# Patient Record
Sex: MALE | Race: WHITE | NOT HISPANIC OR LATINO | Employment: OTHER | URBAN - METROPOLITAN AREA
[De-identification: names, ages, dates, MRNs, and addresses within clinical notes are randomized per-mention and may not be internally consistent; named-entity substitution may affect disease eponyms.]

---

## 2017-02-03 ENCOUNTER — OFFICE VISIT (OUTPATIENT)
Dept: URGENT CARE | Facility: URGENT CARE | Age: 72
End: 2017-02-03
Payer: COMMERCIAL

## 2017-02-03 VITALS
BODY MASS INDEX: 28.97 KG/M2 | SYSTOLIC BLOOD PRESSURE: 108 MMHG | HEIGHT: 72 IN | WEIGHT: 213.9 LBS | TEMPERATURE: 98.7 F | OXYGEN SATURATION: 94 % | DIASTOLIC BLOOD PRESSURE: 70 MMHG | HEART RATE: 96 BPM

## 2017-02-03 DIAGNOSIS — J18.9 PNEUMONIA OF LEFT LUNG DUE TO INFECTIOUS ORGANISM, UNSPECIFIED PART OF LUNG: Primary | ICD-10-CM

## 2017-02-03 PROCEDURE — 99202 OFFICE O/P NEW SF 15 MIN: CPT | Performed by: FAMILY MEDICINE

## 2017-02-03 RX ORDER — SIMVASTATIN 20 MG
20 TABLET ORAL AT BEDTIME
Status: ON HOLD | COMMUNITY
Start: 2017-01-13 | End: 2024-08-25

## 2017-02-03 RX ORDER — AZITHROMYCIN 250 MG/1
TABLET, FILM COATED ORAL
Qty: 6 TABLET | Refills: 0 | Status: ON HOLD | OUTPATIENT
Start: 2017-02-03 | End: 2024-08-25

## 2017-02-03 RX ORDER — DIPHENOXYLATE HYDROCHLORIDE AND ATROPINE SULFATE 2.5; .025 MG/1; MG/1
TABLET ORAL
Status: ON HOLD | COMMUNITY
Start: 2006-07-17 | End: 2024-08-25

## 2017-02-03 NOTE — PROGRESS NOTES
SUBJECTIVE:   Magan Montanez is a 71 year old male presenting with a chief complaint of cough (productive of phlegm, a little worse when supine.  Mild coughing attacks) for 9 days and fever (up to 102 F) starting 7 days ago.  Onset of symptoms was 8 days ago.  Course of illness is still present.    Current and Associated symptoms: as listed above.   Treatment measures tried include none.    Past medical history:    Hyperlipidemia    Current Outpatient Prescriptions   Medication Sig Dispense Refill     aspirin 81 MG tablet Take 81 mg by mouth       Multiple Vitamin (MULTI-VITAMINS) TABS        simvastatin (ZOCOR) 20 MG tablet Take 20 mg by mouth       Social History   Substance Use Topics     Smoking status: Never Smoker      Smokeless tobacco: Never Used     Alcohol Use: 0.0 oz/week     0 Standard drinks or equivalent per week      Comment: rarely       ROS:  Review of systems negative except as stated above.    OBJECTIVE  :/70 mmHg  Pulse 96  Temp(Src) 98.7  F (37.1  C) (Oral)  Ht 6' (1.829 m)  Wt 213 lb 14.4 oz (97.024 kg)  BMI 29.00 kg/m2  SpO2 94%  GENERAL APPEARANCE: healthy, alert and no distress  HENT: TM's normal bilaterally and oral mucous membranes moist, no erythema noted  NECK: supple, nontender, no lymphadenopathy  RESP: rales L lower posterior  CV: regular rates and rhythm, normal S1 S2, no murmur noted    ASSESSMENT:  Cough  Pneumonia of the left lung    PLAN:  Rx:  Azithromycin  follow up with the primary care provider if not better in 7-10 days  Go to the emergency room if there is worsening shortness of breath.   See orders in Epic    Cholo Forbes MD

## 2017-02-03 NOTE — PATIENT INSTRUCTIONS
follow up with your primary care provider if not better in 7-10 days.     Go to the emergency room if there is worsening shortness of breath.

## 2017-02-03 NOTE — MR AVS SNAPSHOT
"              After Visit Summary   2/3/2017    Magan Montanez    MRN: [id number]           Patient Information     Date Of Birth          1945        Visit Information        Provider Department      2/3/2017 9:15 AM Cholo Forbes MD [facility]        Today's Diagnoses     Pneumonia of left lung due to infectious organism, unspecified part of lung    -  1       Care Instructions    follow up with your primary care provider if not better in 7-10 days.     Go to the emergency room if there is worsening shortness of breath.                Follow-ups after your visit        Who to contact     If you have questions or need follow up information about today's clinic visit or your schedule please contact [facility] directly at [phone].  Normal or non-critical lab and imaging results will be communicated to you by "[employer]hart, letter or phone within 4 business days after the clinic has received the results. If you do not hear from us within 7 days, please contact the clinic through "[employer]hart or phone. If you have a critical or abnormal lab result, we will notify you by phone as soon as possible.  Submit refill requests through [employer] or call your pharmacy and they will forward the refill request to us. Please allow 3 business days for your refill to be completed.          Additional Information About Your Visit        MyChart Information     [employer] lets you send messages to your doctor, view your test results, renew your prescriptions, schedule appointments and more. To sign up, go to [email or web address] . Click on \"Log in\" on the left side of the screen, which will take you to the Welcome page. Then click on \"Sign up Now\" on the right side of the page.     You will be asked to enter the access code listed below, as well as some personal information. Please follow the directions to create your username and password.     Your access code is: [email or web address]  Expires: 5/4/2017  9:36 " AM     Your access code will  in 90 days. If you need help or a new code, please call your [place] clinic or [phone].        Care EveryWhere ID     This is your Care EveryWhere ID. This could be used by other organizations to access your [place] medical records  [id number]        Your Vitals Were     Pulse Temperature Height BMI (Body Mass Index) Pulse Oximetry       96 98.7  F (37.1  C) (Oral) 6' (1.829 m) 29.00 kg/m2 94%        Blood Pressure from Last 3 Encounters:   17 108/70    Weight from Last 3 Encounters:   17 213 lb 14.4 oz (97.024 kg)              Today, you had the following     No orders found for display         Today's Medication Changes          These changes are accurate as of: 2/3/17  9:36 AM.  If you have any questions, ask your nurse or doctor.               Start taking these medicines.        Dose/Directions    azithromycin 250 MG tablet   Commonly known as:  ZITHROMAX   Used for:  Pneumonia of left lung due to infectious organism, unspecified part of lung   Started by:  Cholo Forbes MD        Two tablets first day, then one tablet daily for four days.   Quantity:  6 tablet   Refills:  0            Where to get your medicines      These medications were sent to [facility] - [address]  [address]  [address], [place] [address]     Phone:  [phone]    - azithromycin 250 MG tablet             Primary Care Provider    None Specified       No primary provider on file.        Thank you!     Thank you for choosing [facility]  for your care. Our goal is always to provide you with excellent care. Hearing back from our patients is one way we can continue to improve our services. Please take a few minutes to complete the written survey that you may receive in the mail after your visit with us. Thank you!             Your Updated Medication List - Protect others around you: Learn how to safely use, store  and throw away your medicines at www.disposemymeds.org.          This list is accurate as of: 2/3/17  9:36 AM.  Always use your most recent med list.                   Brand Name Dispense Instructions for use    aspirin 81 MG tablet      Take 81 mg by mouth       azithromycin 250 MG tablet    ZITHROMAX    6 tablet    Two tablets first day, then one tablet daily for four days.       MULTI-VITAMINS Tabs          simvastatin 20 MG tablet    ZOCOR     Take 20 mg by mouth

## 2017-02-03 NOTE — NURSING NOTE
Chief Complaint   Patient presents with     Cough     fever 101-102 X 8 days ago       Initial /70 mmHg  Pulse 96  Temp(Src) 98.7  F (37.1  C) (Oral)  Ht 6' (1.829 m)  Wt 213 lb 14.4 oz (97.024 kg)  BMI 29.00 kg/m2  SpO2 94% Estimated body mass index is 29 kg/(m^2) as calculated from the following:    Height as of this encounter: 6' (1.829 m).    Weight as of this encounter: 213 lb 14.4 oz (97.024 kg).  BP completed using cuff size: regular

## 2017-02-04 ENCOUNTER — TELEPHONE (OUTPATIENT)
Dept: NURSING | Facility: CLINIC | Age: 72
End: 2017-02-04

## 2017-02-05 NOTE — TELEPHONE ENCOUNTER
"Call Type: Triage Call    Presenting Problem: \"My  is coughing so bad, he is sitting up  in a chair.\"  Post pneumonia.  Patient says he doesn't like taking a  lot of drugs.  Triage Note:  Guideline Title: Cough - Adult  Recommended Disposition: See Provider within 24 hours  Original Inclination: Wanted to speak with a nurse  Override Disposition:  Intended Action: Follow advice given  Physician Contacted: No  Coughing spells lasting second to minutes occurring for 7 days or longer ?  YES  Known chronic lung disease AND has developed upper respiratory infection symptoms ?  NO  Severe breathing problems ? NO  Continuous cough causing difficulty breathing ? NO  Coughing up large amount of obvious blood (not blood-streaked sputum) ? NO  Blood streaked sputum ? NO  Gradual onset of cough when lying down AND having to sleep on more than one pillow  or with head of bed elevated ? NO  New onset or worsening cough AND temperature up to 101.5F (38.6C) AND not  responding to 12 hours of home care ? NO  Recurrent episodes of uncontrolled coughing interfering with ability to carry out  usual activities or with normal sleep patterns ? NO  Cough producing pink, frothy sputum ? NO  Any temperature elevation in an immunocompromised individual or a frail elderly  person ? NO  New onset or worsening cough AND asthma with increasing frequency of flare-ups  since last scheduled appointment ? NO  New onset or worsening cough AND temperature of 101.5 F (38.6C) or greater ? NO  Breathing symptoms (post choking episode, shortness of breath, out of breath,  nasal flaring, change in skin color, anxiety) main problem ? NO  New onset or worsening cough AND recent (within 4 wks.) surgery or trauma, or  prolonged immobilization (bedrest, long travel), or smoker taking medication with  estrogen ? NO  Gradual onset of cough when lying down AND relieved after being in a sitting or  standing position ? NO  Sudden onset of flu-like symptoms ? " NO  New or worsening cough AND known cardiac or respiratory condition not responding  to treatment OR treatment not available ? NO  New or worsening cough AND known cardiac or respiratory condition ? NO  Sudden onset of shortness of breath, chest pain and cough with blood tinged sputum  ? NO  Being treated by a provider for a secondary infection AND no improvement in  symptoms, symptoms have worsened OR has new symptoms after following treatment  plan for the time specified by provider. ? NO  Onset of cough after starting new prescription or changing dose of medication  within last 7 days, especially ACE inhibitors or beta blockers ? NO  New productive cough with thick colored sputum (other than clear or white sputum;  not postnasal drainage) ? NO  Moderate to severe pain occurring with deep breath without other symptoms ? NO  Productive cough for one full day or more ? NO  Sharp chest pain only occurring with deep breath or cough AND not responsive to  home care ? NO  Physician Instructions:  Care Advice: Call provider if symptoms worsen or new symptoms develop.  Respiratory Hygiene:   - Cover the nose/mouth tightly with a tissue when  coughing or sneezing.    - Use tissue 1 time and discard in the nearest  waste receptacle.    - Wash hands with soap and water or alcohol-based hand  rub after coming into contact with respiratory secretions and contaminated  objects/materials.    - Alternatively when no tissue is available, cough  into the bend of the elbow.  - Avoid touching your eyes, nose or mouth.  Antibiotics kill the bacteria causing whooping cough and help speed  recovery. If you are prescribed antibiotics, take them exactly as ordered  even if you begin to feel better.  Family members may be given preventive  antibiotics. If antibiotics are prescribed, take them for at least 5 days  before being near young children or going to work at a school, a   facility, or a health facility.  See your provider  today if you have a temperature 101.5F (38.6C) or higher,  increased difficulty breathing, chest pain with cough, or cough with  blood-tinged sputum.

## 2024-08-25 ENCOUNTER — APPOINTMENT (OUTPATIENT)
Dept: CT IMAGING | Facility: CLINIC | Age: 79
End: 2024-08-25
Attending: EMERGENCY MEDICINE
Payer: COMMERCIAL

## 2024-08-25 ENCOUNTER — HOSPITAL ENCOUNTER (INPATIENT)
Facility: CLINIC | Age: 79
LOS: 2 days | Discharge: HOME | DRG: 163 | End: 2024-08-27
Attending: HOSPITALIST | Admitting: INTERNAL MEDICINE
Payer: COMMERCIAL

## 2024-08-25 ENCOUNTER — HOSPITAL ENCOUNTER (EMERGENCY)
Facility: CLINIC | Age: 79
Discharge: ANOTHER HOSPITAL | End: 2024-08-25
Attending: EMERGENCY MEDICINE | Admitting: EMERGENCY MEDICINE
Payer: COMMERCIAL

## 2024-08-25 ENCOUNTER — APPOINTMENT (OUTPATIENT)
Dept: ULTRASOUND IMAGING | Facility: CLINIC | Age: 79
End: 2024-08-25
Attending: EMERGENCY MEDICINE
Payer: COMMERCIAL

## 2024-08-25 VITALS
BODY MASS INDEX: 30.28 KG/M2 | OXYGEN SATURATION: 94 % | TEMPERATURE: 98.7 F | DIASTOLIC BLOOD PRESSURE: 146 MMHG | WEIGHT: 223.55 LBS | HEART RATE: 50 BPM | HEIGHT: 72 IN | SYSTOLIC BLOOD PRESSURE: 154 MMHG | RESPIRATION RATE: 14 BRPM

## 2024-08-25 DIAGNOSIS — I26.92 ACUTE SADDLE PULMONARY EMBOLISM WITHOUT ACUTE COR PULMONALE (H): ICD-10-CM

## 2024-08-25 DIAGNOSIS — I26.02 ACUTE SADDLE PULMONARY EMBOLISM WITH ACUTE COR PULMONALE (H): Primary | ICD-10-CM

## 2024-08-25 LAB
ANION GAP SERPL CALCULATED.3IONS-SCNC: 13 MMOL/L (ref 7–15)
BASE EXCESS BLDV CALC-SCNC: 0.2 MMOL/L (ref -3–3)
BASOPHILS # BLD AUTO: 0.1 10E3/UL (ref 0–0.2)
BASOPHILS NFR BLD AUTO: 1 %
BUN SERPL-MCNC: 17 MG/DL (ref 8–23)
CALCIUM SERPL-MCNC: 8.9 MG/DL (ref 8.8–10.4)
CHLORIDE SERPL-SCNC: 105 MMOL/L (ref 98–107)
CREAT SERPL-MCNC: 1.18 MG/DL (ref 0.67–1.17)
D DIMER PPP FEU-MCNC: >20 UG/ML FEU (ref 0–0.5)
EGFRCR SERPLBLD CKD-EPI 2021: 64 ML/MIN/1.73M2
EOSINOPHIL # BLD AUTO: 0.1 10E3/UL (ref 0–0.7)
EOSINOPHIL NFR BLD AUTO: 1 %
ERYTHROCYTE [DISTWIDTH] IN BLOOD BY AUTOMATED COUNT: 11.9 % (ref 10–15)
ERYTHROCYTE [DISTWIDTH] IN BLOOD BY AUTOMATED COUNT: 12 % (ref 10–15)
ERYTHROCYTE [DISTWIDTH] IN BLOOD BY AUTOMATED COUNT: 12.1 % (ref 10–15)
FLUAV RNA SPEC QL NAA+PROBE: NEGATIVE
FLUBV RNA RESP QL NAA+PROBE: NEGATIVE
GLUCOSE BLDC GLUCOMTR-MCNC: 116 MG/DL (ref 70–99)
GLUCOSE SERPL-MCNC: 115 MG/DL (ref 70–99)
HCO3 BLDV-SCNC: 24 MMOL/L (ref 21–28)
HCO3 SERPL-SCNC: 20 MMOL/L (ref 22–29)
HCT VFR BLD AUTO: 39.5 % (ref 40–53)
HCT VFR BLD AUTO: 41.9 % (ref 40–53)
HCT VFR BLD AUTO: 43 % (ref 40–53)
HGB BLD-MCNC: 13.1 G/DL (ref 13.3–17.7)
HGB BLD-MCNC: 13.4 G/DL (ref 13.3–17.7)
HGB BLD-MCNC: 14.1 G/DL (ref 13.3–17.7)
IMM GRANULOCYTES # BLD: 0 10E3/UL
IMM GRANULOCYTES NFR BLD: 0 %
LYMPHOCYTES # BLD AUTO: 2 10E3/UL (ref 0.8–5.3)
LYMPHOCYTES NFR BLD AUTO: 24 %
MCH RBC QN AUTO: 27.9 PG (ref 26.5–33)
MCH RBC QN AUTO: 28.1 PG (ref 26.5–33)
MCH RBC QN AUTO: 28.2 PG (ref 26.5–33)
MCHC RBC AUTO-ENTMCNC: 32 G/DL (ref 31.5–36.5)
MCHC RBC AUTO-ENTMCNC: 32.8 G/DL (ref 31.5–36.5)
MCHC RBC AUTO-ENTMCNC: 33.2 G/DL (ref 31.5–36.5)
MCV RBC AUTO: 85 FL (ref 78–100)
MCV RBC AUTO: 86 FL (ref 78–100)
MCV RBC AUTO: 87 FL (ref 78–100)
MONOCYTES # BLD AUTO: 0.6 10E3/UL (ref 0–1.3)
MONOCYTES NFR BLD AUTO: 7 %
NEUTROPHILS # BLD AUTO: 5.6 10E3/UL (ref 1.6–8.3)
NEUTROPHILS NFR BLD AUTO: 66 %
NRBC # BLD AUTO: 0 10E3/UL
NRBC BLD AUTO-RTO: 0 /100
NT-PROBNP SERPL-MCNC: 209 PG/ML (ref 0–1800)
O2/TOTAL GAS SETTING VFR VENT: 0 %
OXYHGB MFR BLDV: 83 % (ref 70–75)
PCO2 BLDV: 37 MM HG (ref 40–50)
PH BLDV: 7.43 [PH] (ref 7.32–7.43)
PLATELET # BLD AUTO: 194 10E3/UL (ref 150–450)
PLATELET # BLD AUTO: 195 10E3/UL (ref 150–450)
PLATELET # BLD AUTO: 217 10E3/UL (ref 150–450)
PO2 BLDV: 46 MM HG (ref 25–47)
POTASSIUM SERPL-SCNC: 4 MMOL/L (ref 3.4–5.3)
RADIOLOGIST FLAGS: ABNORMAL
RADIOLOGIST FLAGS: ABNORMAL
RBC # BLD AUTO: 4.65 10E6/UL (ref 4.4–5.9)
RBC # BLD AUTO: 4.81 10E6/UL (ref 4.4–5.9)
RBC # BLD AUTO: 5.01 10E6/UL (ref 4.4–5.9)
RSV RNA SPEC NAA+PROBE: NEGATIVE
SAO2 % BLDV: 84.4 % (ref 70–75)
SARS-COV-2 RNA RESP QL NAA+PROBE: NEGATIVE
SODIUM SERPL-SCNC: 138 MMOL/L (ref 135–145)
TROPONIN T SERPL HS-MCNC: 293 NG/L
WBC # BLD AUTO: 8.1 10E3/UL (ref 4–11)
WBC # BLD AUTO: 8.4 10E3/UL (ref 4–11)
WBC # BLD AUTO: 9.1 10E3/UL (ref 4–11)

## 2024-08-25 PROCEDURE — 84484 ASSAY OF TROPONIN QUANT: CPT | Performed by: EMERGENCY MEDICINE

## 2024-08-25 PROCEDURE — 82805 BLOOD GASES W/O2 SATURATION: CPT | Performed by: EMERGENCY MEDICINE

## 2024-08-25 PROCEDURE — 71275 CT ANGIOGRAPHY CHEST: CPT

## 2024-08-25 PROCEDURE — 99223 1ST HOSP IP/OBS HIGH 75: CPT | Performed by: INTERNAL MEDICINE

## 2024-08-25 PROCEDURE — 250N000011 HC RX IP 250 OP 636: Performed by: EMERGENCY MEDICINE

## 2024-08-25 PROCEDURE — 85027 COMPLETE CBC AUTOMATED: CPT | Mod: 91 | Performed by: EMERGENCY MEDICINE

## 2024-08-25 PROCEDURE — 250N000013 HC RX MED GY IP 250 OP 250 PS 637: Performed by: INTERNAL MEDICINE

## 2024-08-25 PROCEDURE — 99291 CRITICAL CARE FIRST HOUR: CPT | Mod: 25

## 2024-08-25 PROCEDURE — 85027 COMPLETE CBC AUTOMATED: CPT | Performed by: INTERNAL MEDICINE

## 2024-08-25 PROCEDURE — 93005 ELECTROCARDIOGRAM TRACING: CPT

## 2024-08-25 PROCEDURE — 36415 COLL VENOUS BLD VENIPUNCTURE: CPT | Performed by: EMERGENCY MEDICINE

## 2024-08-25 PROCEDURE — 85379 FIBRIN DEGRADATION QUANT: CPT | Performed by: EMERGENCY MEDICINE

## 2024-08-25 PROCEDURE — 96374 THER/PROPH/DIAG INJ IV PUSH: CPT | Mod: 59

## 2024-08-25 PROCEDURE — 87637 SARSCOV2&INF A&B&RSV AMP PRB: CPT | Performed by: EMERGENCY MEDICINE

## 2024-08-25 PROCEDURE — 99285 EMERGENCY DEPT VISIT HI MDM: CPT | Mod: 25

## 2024-08-25 PROCEDURE — 250N000011 HC RX IP 250 OP 636: Performed by: INTERNAL MEDICINE

## 2024-08-25 PROCEDURE — 120N000013 HC R&B IMCU

## 2024-08-25 PROCEDURE — 80048 BASIC METABOLIC PNL TOTAL CA: CPT | Performed by: EMERGENCY MEDICINE

## 2024-08-25 PROCEDURE — 36415 COLL VENOUS BLD VENIPUNCTURE: CPT | Performed by: INTERNAL MEDICINE

## 2024-08-25 PROCEDURE — 258N000003 HC RX IP 258 OP 636: Performed by: INTERNAL MEDICINE

## 2024-08-25 PROCEDURE — 83880 ASSAY OF NATRIURETIC PEPTIDE: CPT | Performed by: EMERGENCY MEDICINE

## 2024-08-25 PROCEDURE — 250N000009 HC RX 250: Performed by: EMERGENCY MEDICINE

## 2024-08-25 PROCEDURE — 93970 EXTREMITY STUDY: CPT

## 2024-08-25 PROCEDURE — 85025 COMPLETE CBC W/AUTO DIFF WBC: CPT | Performed by: EMERGENCY MEDICINE

## 2024-08-25 RX ORDER — LIDOCAINE 40 MG/G
CREAM TOPICAL
Status: DISCONTINUED | OUTPATIENT
Start: 2024-08-25 | End: 2024-08-27 | Stop reason: HOSPADM

## 2024-08-25 RX ORDER — ACETAMINOPHEN 650 MG/1
650 SUPPOSITORY RECTAL EVERY 4 HOURS PRN
Status: DISCONTINUED | OUTPATIENT
Start: 2024-08-25 | End: 2024-08-27 | Stop reason: HOSPADM

## 2024-08-25 RX ORDER — AMOXICILLIN 250 MG
2 CAPSULE ORAL 2 TIMES DAILY PRN
Status: DISCONTINUED | OUTPATIENT
Start: 2024-08-25 | End: 2024-08-27 | Stop reason: HOSPADM

## 2024-08-25 RX ORDER — ASPIRIN 325 MG
2 TABLET ORAL EVERY EVENING
COMMUNITY

## 2024-08-25 RX ORDER — SODIUM CHLORIDE 9 MG/ML
INJECTION, SOLUTION INTRAVENOUS CONTINUOUS
Status: DISCONTINUED | OUTPATIENT
Start: 2024-08-25 | End: 2024-08-26

## 2024-08-25 RX ORDER — HEPARIN SODIUM 10000 [USP'U]/100ML
0-5000 INJECTION, SOLUTION INTRAVENOUS CONTINUOUS
Status: DISCONTINUED | OUTPATIENT
Start: 2024-08-25 | End: 2024-08-25 | Stop reason: HOSPADM

## 2024-08-25 RX ORDER — LOPERAMIDE HCL 2 MG
2 CAPSULE ORAL DAILY
COMMUNITY

## 2024-08-25 RX ORDER — LISINOPRIL 5 MG/1
5 TABLET ORAL DAILY
COMMUNITY

## 2024-08-25 RX ORDER — SIMVASTATIN 20 MG
20 TABLET ORAL AT BEDTIME
Status: DISCONTINUED | OUTPATIENT
Start: 2024-08-25 | End: 2024-08-27 | Stop reason: HOSPADM

## 2024-08-25 RX ORDER — LISINOPRIL 5 MG/1
5 TABLET ORAL DAILY
Status: DISCONTINUED | OUTPATIENT
Start: 2024-08-26 | End: 2024-08-27 | Stop reason: HOSPADM

## 2024-08-25 RX ORDER — ONDANSETRON 4 MG/1
4 TABLET, ORALLY DISINTEGRATING ORAL EVERY 6 HOURS PRN
Status: DISCONTINUED | OUTPATIENT
Start: 2024-08-25 | End: 2024-08-27 | Stop reason: HOSPADM

## 2024-08-25 RX ORDER — CALCIUM CARBONATE 500 MG/1
1000 TABLET, CHEWABLE ORAL 4 TIMES DAILY PRN
Status: DISCONTINUED | OUTPATIENT
Start: 2024-08-25 | End: 2024-08-27 | Stop reason: HOSPADM

## 2024-08-25 RX ORDER — HEPARIN SODIUM 10000 [USP'U]/100ML
0-5000 INJECTION, SOLUTION INTRAVENOUS CONTINUOUS
Status: DISCONTINUED | OUTPATIENT
Start: 2024-08-25 | End: 2024-08-27

## 2024-08-25 RX ORDER — LIDOCAINE 40 MG/G
CREAM TOPICAL
Status: DISCONTINUED | OUTPATIENT
Start: 2024-08-25 | End: 2024-08-25

## 2024-08-25 RX ORDER — ACETAMINOPHEN 325 MG/1
650 TABLET ORAL EVERY 4 HOURS PRN
Status: DISCONTINUED | OUTPATIENT
Start: 2024-08-25 | End: 2024-08-27 | Stop reason: HOSPADM

## 2024-08-25 RX ORDER — ONDANSETRON 2 MG/ML
4 INJECTION INTRAMUSCULAR; INTRAVENOUS EVERY 6 HOURS PRN
Status: DISCONTINUED | OUTPATIENT
Start: 2024-08-25 | End: 2024-08-27 | Stop reason: HOSPADM

## 2024-08-25 RX ORDER — IOPAMIDOL 755 MG/ML
84 INJECTION, SOLUTION INTRAVASCULAR ONCE
Status: COMPLETED | OUTPATIENT
Start: 2024-08-25 | End: 2024-08-25

## 2024-08-25 RX ORDER — AMOXICILLIN 250 MG
1 CAPSULE ORAL 2 TIMES DAILY PRN
Status: DISCONTINUED | OUTPATIENT
Start: 2024-08-25 | End: 2024-08-27 | Stop reason: HOSPADM

## 2024-08-25 RX ORDER — SIMVASTATIN 20 MG
20 TABLET ORAL AT BEDTIME
COMMUNITY

## 2024-08-25 RX ADMIN — HEPARIN SODIUM 1800 UNITS/HR: 10000 INJECTION, SOLUTION INTRAVENOUS at 21:46

## 2024-08-25 RX ADMIN — SODIUM CHLORIDE 100 ML: 9 INJECTION, SOLUTION INTRAVENOUS at 17:54

## 2024-08-25 RX ADMIN — SIMVASTATIN 20 MG: 20 TABLET, FILM COATED ORAL at 23:09

## 2024-08-25 RX ADMIN — SODIUM CHLORIDE: 9 INJECTION, SOLUTION INTRAVENOUS at 21:57

## 2024-08-25 RX ADMIN — IOPAMIDOL 84 ML: 755 INJECTION, SOLUTION INTRAVENOUS at 17:53

## 2024-08-25 RX ADMIN — HEPARIN SODIUM 1800 UNITS/HR: 10000 INJECTION, SOLUTION INTRAVENOUS at 18:29

## 2024-08-25 ASSESSMENT — ACTIVITIES OF DAILY LIVING (ADL)
ADLS_ACUITY_SCORE: 35

## 2024-08-25 ASSESSMENT — COLUMBIA-SUICIDE SEVERITY RATING SCALE - C-SSRS
6. HAVE YOU EVER DONE ANYTHING, STARTED TO DO ANYTHING, OR PREPARED TO DO ANYTHING TO END YOUR LIFE?: NO
1. IN THE PAST MONTH, HAVE YOU WISHED YOU WERE DEAD OR WISHED YOU COULD GO TO SLEEP AND NOT WAKE UP?: NO
2. HAVE YOU ACTUALLY HAD ANY THOUGHTS OF KILLING YOURSELF IN THE PAST MONTH?: NO

## 2024-08-25 NOTE — ED TRIAGE NOTES
Pt is complaining of worsening SOB. Over the last few days, Pt has been SOB with basic activities. Today was/has been the worst of Pt's SOB. Pt denies chest pain, denies cough, swelling or recent weight gain. Does have a history of HTN.

## 2024-08-25 NOTE — ED PROVIDER NOTES
Emergency Department Note      History of Present Illness     Chief Complaint   Shortness of Breath    HPI   Magan Montanez is a 79 year old male with a history of hypertension and hyperlipidemia who presents to the ER for shortness of breath. Patient reports noticing increasing shortness of breath during his daily walk that was really bad this morning when climbing the stairs. He denies chest pain, heaviness, tightness, needing to be propped up when sleeping, swelling in the legs, sickness, or recent travel. Magan takes atorvastatin and lisinopril and notes that his dad  of a heart attack during surgery in his 70's. Patient's wife has been sick the last 4 days and he has not been eating well as a result.     Independent Historian   None    Review of External Notes   None.     Past Medical History     Medical History and Problem List   Hypertension   Anisometropia  Hyperlipidemia   Single kidney  SNHL    Medications   Aspirin 81 mg  Azithromycin   simvastatin   Lisinopril   Loperamide     Surgical History   Nephrectomy   Pyloric stenosis operation  Sigmoid colectomy  Foot surgery   Physical Exam     Patient Vitals for the past 24 hrs:   BP Temp Temp src Pulse Resp SpO2 Height Weight   24 1832 -- -- -- 90 17 95 % -- --   24 1831 -- -- -- 92 20 95 % -- --   24 1830 -- -- -- 90 18 96 % -- --   24 1823 (!) 160/93 -- -- 97 -- 91 % -- --   24 1726 -- -- -- 94 23 95 % -- --   24 1724 -- -- -- 91 10 95 % -- --   24 1607 (!) 154/97 -- -- 95 -- 96 % -- --   24 1532 (!) 193/104 98.7  F (37.1  C) Oral 110 22 93 % 1.829 m (6') 101.4 kg (223 lb 8.7 oz)     Physical Exam  Constitutional: Alert, attentive, GCS 15  HENT:    Nose: Nose normal.    Mouth/Throat: Oropharynx is clear, mucous membranes are moist.   Eyes: EOM are normal, anicteric, conjugate gaze  CV: regular rate and tachycardic.   Chest: Effort normal and breath sounds clear without wheezing or rales, symmetric  bilaterally   GI:  non tender. No distension. No guarding or rebound.    MSK: No LE edema, no tenderness to palpation of BLE.  Neurological: Alert, attentive, moving all extremities equally.   Skin: Skin is warm and dry.     Diagnostics     Lab Results   Labs Ordered and Resulted from Time of ED Arrival to Time of ED Departure   BLOOD GAS VENOUS - Abnormal       Result Value    pH Venous 7.43      pCO2 Venous 37 (*)     pO2 Venous 46      Bicarbonate Venous 24      Base Excess/Deficit Venous 0.2      FIO2 0      Oxyhemoglobin Venous 83 (*)     O2 Sat, Venous 84.4 (*)    BASIC METABOLIC PANEL - Abnormal    Sodium 138      Potassium 4.0      Chloride 105      Carbon Dioxide (CO2) 20 (*)     Anion Gap 13      Urea Nitrogen 17.0      Creatinine 1.18 (*)     GFR Estimate 64      Calcium 8.9      Glucose 115 (*)    TROPONIN T, HIGH SENSITIVITY - Abnormal    Troponin T, High Sensitivity 293 (*)    D DIMER QUANTITATIVE - Abnormal    D-Dimer Quantitative >20.00 (*)    NT PROBNP INPATIENT - Normal    N terminal Pro BNP Inpatient 209     INFLUENZA A/B, RSV, & SARS-COV2 PCR - Normal    Influenza A PCR Negative      Influenza B PCR Negative      RSV PCR Negative      SARS CoV2 PCR Negative     CBC WITH PLATELETS AND DIFFERENTIAL    WBC Count 8.4      RBC Count 5.01      Hemoglobin 14.1      Hematocrit 43.0      MCV 86      MCH 28.1      MCHC 32.8      RDW 12.0      Platelet Count 217      % Neutrophils 66      % Lymphocytes 24      % Monocytes 7      % Eosinophils 1      % Basophils 1      % Immature Granulocytes 0      NRBCs per 100 WBC 0      Absolute Neutrophils 5.6      Absolute Lymphocytes 2.0      Absolute Monocytes 0.6      Absolute Eosinophils 0.1      Absolute Basophils 0.1      Absolute Immature Granulocytes 0.0      Absolute NRBCs 0.0     CBC WITH PLATELETS       Imaging   CT Chest Pulmonary Embolism w Contrast   Final Result   Abnormal   IMPRESSION:   1.  Saddle pulmonary embolism extending into all 5 lobes with  RV/LV ratio greater than 1 suggesting right heart strain.   2.  Few tiny pulmonary nodules. Per Fleischner Society guidelines, if the patient is at low risk for pulmonary malignancy, no dedicated imaging follow-up is required. If the patient is at high risk for pulmonary malignancy, consider optional follow-up CT    chest in 12 months.      [Critical Result: New diagnosis of pulmonary embolism]      Finding was identified on 8/25/2024 6:20 PM CDT.       Dr. Patricio was contacted by me on 8/25/2024 6:22 PM CDT and verbalized understanding of the critical result.       US Lower Extremity Venous Duplex Bilateral   Final Result   Abnormal   IMPRESSION:   1.  Nonocclusive left popliteal vein DVT.   2.  No DVT in the right lower extremity.      [Critical Result: DVT]      Finding was identified on 8/25/2024 6:19 PM CDT.       Dr. Patricio was contacted by me on 8/25/2024 6:22 PM CDT and verbalized understanding of the critical result.           EKG   ECG results from 08/25/24   EKG 12 lead     Value    Systolic Blood Pressure     Diastolic Blood Pressure     Ventricular Rate 99    Atrial Rate 99    AL Interval 170    QRS Duration 86        QTc 462    P Axis 64    R AXIS 52    T Axis 72    Interpretation ECG      Sinus rhythm  Inferior infarct , age undetermined  T wave abnormality, consider anterior ischemia  Abnormal ECG  No previous ECGs available  I reviewed this ECG at 1725.      Independent Interpretation   I reviewed the chest CT scan and found a central saddle PE, no pneumothorax.   ED Course      Medications Administered   Medications   heparin 25,000 units in 0.45% NaCl 250 mL ANTICOAGULANT infusion (1,800 Units/hr Intravenous $New Bag 8/25/24 4135)   iopamidol (ISOVUE-370) solution 84 mL (84 mLs Intravenous $Given 8/25/24 2356)   sodium chloride 0.9 % bag 500mL for CT scan flush use (100 mLs Intravenous $Given 8/25/24 1924)   heparin ANTICOAGULANT loading dose for  HIGH INTENSITY TREATMENT* Give BEFORE  starting heparin infusion (8,000 Units Intravenous $Given 8/25/24 1829)       Discussion of Management   Admitting Hospitalist, Dr. Pierre  Interventional Radiology, Dr. Brandon    ED Course   ED Course as of 08/25/24 1848   Sun Aug 25, 2024   1548 I obtained the history and examined the patient as noted above.    1808 I rechecked patient and explained findings and plan of care.     1813 I spoke with Dr. Brandon, Interventional Radiology, regarding the patient's presentation, findings, and plan of care.     1816 I rechecked patient and explained findings and plan of care.     1822 I spoke with Dr. Brandon, IR, regarding the patient's presentation, findings, and plan of care. Patient has a saddle PE.    1836 I spoke with Dr. Pierre, hospitalist, who accepts the patient for [facility].        Additional Documentation  None    Medical Decision Making / Diagnosis     CMS Diagnoses: None    MIPS    CT for PE was ordered because the patient had an abnormal d-dimer.      YESY Montanez is a 79 year old male past medical history seen for hypertension, hyperlipidemia and single kidney presenting for evaluation of shortness of breath over the last few weeks, on arrival here noted to be mildly tachycardic, with sats of 93%.  Based on the vitals and his story without any chest pain, infectious symptoms, D-dimer was sent which was greater than 20 after EKG showed no overt ischemic changes, he does have some Q waves in lead III but no inverted T waves.  CT PE study confirmed saddle PE with right heart strain, his troponin moderately elevated at 290 though he has not had any chest pain, BNP is normal, do not suspect missed MI and suspect type II MI secondary to PE.  He was placed on heparin, touch base with IR who thinks just based on the location of his PE he would benefit from thrombectomy though this does not need to be emergent given he has reassuring vital signs and overall small clot burden in spite of his saddle  PE.  As such we will make him n.p.o. with plan to treat after consultation at bedside with IR tomorrow and we will transfer him to [facility] for admission to [facility] for close hemodynamic monitoring.  Patient discussed with accepting hospitalist, Dr. Pierre.     Critical Care time was 40 minutes for this patient excluding procedures.      Disposition   The patient was admitted to the hospital.     Diagnosis     ICD-10-CM    1. Acute saddle pulmonary embolism without acute cor pulmonale (H)  I26.92            Juancarlos Patricio MD  [employer]  6:48 PM 08/25/24       Scribe Disclosure:  I, Juancarlos Gonsalez, am serving as a scribe at 3:58 PM on 8/25/2024 to document services personally performed by Juancarlos Patricio MD based on my observations and the provider's statements to me.        Juancarlos Patricio MD  08/25/24 3112

## 2024-08-26 ENCOUNTER — APPOINTMENT (OUTPATIENT)
Dept: INTERVENTIONAL RADIOLOGY/VASCULAR | Facility: CLINIC | Age: 79
DRG: 163 | End: 2024-08-26
Attending: INTERNAL MEDICINE
Payer: COMMERCIAL

## 2024-08-26 LAB
ACT BLD: 148 SECONDS (ref 74–150)
ACT BLD: 173 SECONDS (ref 74–150)
ATRIAL RATE - MUSE: 99 BPM
DIASTOLIC BLOOD PRESSURE - MUSE: NORMAL MMHG
INTERPRETATION ECG - MUSE: NORMAL
P AXIS - MUSE: 64 DEGREES
PR INTERVAL - MUSE: 170 MS
QRS DURATION - MUSE: 86 MS
QT - MUSE: 360 MS
QTC - MUSE: 462 MS
R AXIS - MUSE: 52 DEGREES
SYSTOLIC BLOOD PRESSURE - MUSE: NORMAL MMHG
T AXIS - MUSE: 72 DEGREES
UFH PPP CHRO-ACNC: 0.24 IU/ML
UFH PPP CHRO-ACNC: >1.1 IU/ML
UFH PPP CHRO-ACNC: >1.1 IU/ML
VENTRICULAR RATE- MUSE: 99 BPM

## 2024-08-26 PROCEDURE — 85520 HEPARIN ASSAY: CPT | Performed by: INTERNAL MEDICINE

## 2024-08-26 PROCEDURE — 258N000003 HC RX IP 258 OP 636: Performed by: INTERNAL MEDICINE

## 2024-08-26 PROCEDURE — 250N000011 HC RX IP 250 OP 636: Performed by: RADIOLOGY

## 2024-08-26 PROCEDURE — 255N000002 HC RX 255 OP 636: Performed by: RADIOLOGY

## 2024-08-26 PROCEDURE — 02CQ3ZZ EXTIRPATION OF MATTER FROM RIGHT PULMONARY ARTERY, PERCUTANEOUS APPROACH: ICD-10-PCS | Performed by: NURSE PRACTITIONER

## 2024-08-26 PROCEDURE — 250N000009 HC RX 250: Performed by: NURSE PRACTITIONER

## 2024-08-26 PROCEDURE — 272N000566 HC SHEATH CR3

## 2024-08-26 PROCEDURE — C1769 GUIDE WIRE: HCPCS

## 2024-08-26 PROCEDURE — 36415 COLL VENOUS BLD VENIPUNCTURE: CPT | Performed by: INTERNAL MEDICINE

## 2024-08-26 PROCEDURE — 99233 SBSQ HOSP IP/OBS HIGH 50: CPT | Performed by: INTERNAL MEDICINE

## 2024-08-26 PROCEDURE — 250N000013 HC RX MED GY IP 250 OP 250 PS 637: Performed by: INTERNAL MEDICINE

## 2024-08-26 PROCEDURE — 272N000194 HC ACCESSORY CR3

## 2024-08-26 PROCEDURE — 272N000567 HC SHEATH CR4

## 2024-08-26 PROCEDURE — 02CR3ZZ EXTIRPATION OF MATTER FROM LEFT PULMONARY ARTERY, PERCUTANEOUS APPROACH: ICD-10-PCS | Performed by: NURSE PRACTITIONER

## 2024-08-26 PROCEDURE — 36015 PLACE CATHETER IN ARTERY: CPT | Mod: XS

## 2024-08-26 PROCEDURE — 272N000116 HC CATH CR1

## 2024-08-26 PROCEDURE — 272N000196 HC ACCESSORY CR5

## 2024-08-26 PROCEDURE — 120N000013 HC R&B IMCU

## 2024-08-26 PROCEDURE — 37185 PRIM ART M-THRMBC SBSQ VSL: CPT

## 2024-08-26 PROCEDURE — 85347 COAGULATION TIME ACTIVATED: CPT

## 2024-08-26 PROCEDURE — 37184 PRIM ART M-THRMBC 1ST VSL: CPT | Mod: 50

## 2024-08-26 PROCEDURE — 250N000011 HC RX IP 250 OP 636: Performed by: INTERNAL MEDICINE

## 2024-08-26 PROCEDURE — 250N000011 HC RX IP 250 OP 636: Performed by: NURSE PRACTITIONER

## 2024-08-26 RX ORDER — NALOXONE HYDROCHLORIDE 0.4 MG/ML
0.4 INJECTION, SOLUTION INTRAMUSCULAR; INTRAVENOUS; SUBCUTANEOUS
Status: DISCONTINUED | OUTPATIENT
Start: 2024-08-26 | End: 2024-08-26 | Stop reason: HOSPADM

## 2024-08-26 RX ORDER — NALOXONE HYDROCHLORIDE 0.4 MG/ML
0.2 INJECTION, SOLUTION INTRAMUSCULAR; INTRAVENOUS; SUBCUTANEOUS
Status: DISCONTINUED | OUTPATIENT
Start: 2024-08-26 | End: 2024-08-26 | Stop reason: HOSPADM

## 2024-08-26 RX ORDER — HEPARIN SODIUM 200 [USP'U]/100ML
1 INJECTION, SOLUTION INTRAVENOUS EVERY 5 MIN PRN
Status: DISCONTINUED | OUTPATIENT
Start: 2024-08-26 | End: 2024-08-26 | Stop reason: HOSPADM

## 2024-08-26 RX ORDER — IOPAMIDOL 612 MG/ML
100 INJECTION, SOLUTION INTRAVASCULAR ONCE
Status: COMPLETED | OUTPATIENT
Start: 2024-08-26 | End: 2024-08-26

## 2024-08-26 RX ORDER — HYDRALAZINE HYDROCHLORIDE 20 MG/ML
10 INJECTION INTRAMUSCULAR; INTRAVENOUS EVERY 4 HOURS PRN
Status: DISCONTINUED | OUTPATIENT
Start: 2024-08-26 | End: 2024-08-27 | Stop reason: HOSPADM

## 2024-08-26 RX ORDER — FLUMAZENIL 0.1 MG/ML
0.2 INJECTION, SOLUTION INTRAVENOUS
Status: DISCONTINUED | OUTPATIENT
Start: 2024-08-26 | End: 2024-08-26 | Stop reason: HOSPADM

## 2024-08-26 RX ORDER — FENTANYL CITRATE 50 UG/ML
25-50 INJECTION, SOLUTION INTRAMUSCULAR; INTRAVENOUS EVERY 5 MIN PRN
Status: DISCONTINUED | OUTPATIENT
Start: 2024-08-26 | End: 2024-08-26 | Stop reason: HOSPADM

## 2024-08-26 RX ORDER — HEPARIN SODIUM 1000 [USP'U]/ML
500-6000 INJECTION, SOLUTION INTRAVENOUS; SUBCUTANEOUS
Status: COMPLETED | OUTPATIENT
Start: 2024-08-26 | End: 2024-08-26

## 2024-08-26 RX ADMIN — HEPARIN SODIUM 1350 UNITS/HR: 10000 INJECTION, SOLUTION INTRAVENOUS at 23:45

## 2024-08-26 RX ADMIN — MIDAZOLAM 1 MG: 1 INJECTION INTRAMUSCULAR; INTRAVENOUS at 10:35

## 2024-08-26 RX ADMIN — MIDAZOLAM 1 MG: 1 INJECTION INTRAMUSCULAR; INTRAVENOUS at 10:27

## 2024-08-26 RX ADMIN — SIMVASTATIN 20 MG: 20 TABLET, FILM COATED ORAL at 21:14

## 2024-08-26 RX ADMIN — FENTANYL CITRATE 50 MCG: 50 INJECTION, SOLUTION INTRAMUSCULAR; INTRAVENOUS at 10:35

## 2024-08-26 RX ADMIN — HEPARIN SODIUM 4 BAG: 200 INJECTION, SOLUTION INTRAVENOUS at 09:42

## 2024-08-26 RX ADMIN — HEPARIN SODIUM 1800 UNITS/HR: 10000 INJECTION, SOLUTION INTRAVENOUS at 03:20

## 2024-08-26 RX ADMIN — ACETAMINOPHEN 650 MG: 325 TABLET ORAL at 12:39

## 2024-08-26 RX ADMIN — MIDAZOLAM 1 MG: 1 INJECTION INTRAMUSCULAR; INTRAVENOUS at 11:05

## 2024-08-26 RX ADMIN — LIDOCAINE HYDROCHLORIDE 10 ML: 10 INJECTION, SOLUTION INFILTRATION; PERINEURAL at 10:36

## 2024-08-26 RX ADMIN — FENTANYL CITRATE 50 MCG: 50 INJECTION, SOLUTION INTRAMUSCULAR; INTRAVENOUS at 10:27

## 2024-08-26 RX ADMIN — FENTANYL CITRATE 50 MCG: 50 INJECTION, SOLUTION INTRAMUSCULAR; INTRAVENOUS at 11:33

## 2024-08-26 RX ADMIN — FENTANYL CITRATE 50 MCG: 50 INJECTION, SOLUTION INTRAMUSCULAR; INTRAVENOUS at 11:05

## 2024-08-26 RX ADMIN — HEPARIN SODIUM 4000 UNITS: 1000 INJECTION INTRAVENOUS; SUBCUTANEOUS at 10:54

## 2024-08-26 RX ADMIN — MIDAZOLAM 1 MG: 1 INJECTION INTRAMUSCULAR; INTRAVENOUS at 11:33

## 2024-08-26 RX ADMIN — SODIUM CHLORIDE: 9 INJECTION, SOLUTION INTRAVENOUS at 05:33

## 2024-08-26 RX ADMIN — IOPAMIDOL 40 ML: 612 INJECTION, SOLUTION INTRAVENOUS at 12:03

## 2024-08-26 ASSESSMENT — ACTIVITIES OF DAILY LIVING (ADL)
ADLS_ACUITY_SCORE: 21
ADLS_ACUITY_SCORE: 23
ADLS_ACUITY_SCORE: 21
ADLS_ACUITY_SCORE: 23
ADLS_ACUITY_SCORE: 21
ADLS_ACUITY_SCORE: 23
ADLS_ACUITY_SCORE: 21

## 2024-08-26 NOTE — IR NOTE
Patient Name: Magan Montanez  Medical Record Number: [id number]  Today's Date: 8/26/2024    Procedure: Bilateral Pulmonary Angiogram  Proceduralist: Dr. Alexander  Pathology present: no    Procedure Start: 1026  Procedure end: 1200  Sedation medications administered: Last Dose: 1132, Fentanyl 200 mcg, Versed 4 mg, Lidocaine 10 ml's, Heparin 4000 units.    Report given to: ARMANI Mireles Rn  : no    Other Notes: Pt will require 4 bedrest post procedure. Pt arrived to IR room 1 from 314. Consent reviewed. Pt denies any questions or concerns regarding procedure. Pt positioned supine and monitored per protocol. Pt tolerated procedure without any noted complications.

## 2024-08-26 NOTE — PLAN OF CARE
Patient Name: Magan Montanez  MRN: [id number]  Date of Admission: 8/25/2024  Reason for Admission: PE and DVT  Level of Care: [facility]    Vitals:   BP Readings from Last 1 Encounters:   08/26/24 (!) 165/92     Pulse Readings from Last 1 Encounters:   08/26/24 85     Wt Readings from Last 1 Encounters:   08/25/24 101.4 kg (223 lb 8.7 oz)     Ht Readings from Last 1 Encounters:   08/25/24 1.829 m (6')     Estimated body mass index is 30.32 kg/m  as calculated from the following:    Height as of an earlier encounter on 8/25/24: 1.829 m (6').    Weight as of an earlier encounter on 8/25/24: 101.4 kg (223 lb 8.7 oz).  Temp Readings from Last 1 Encounters:   08/26/24 98.3  F (36.8  C) (Oral)       Pain: Denies pain    CV Surgery Patient: No    Assessment    Resp: LS clear. Denies SOB. RA and saturation above 90%.  Telemetry: SR w/ T wave abnormality.  Neuro: Alert and oriented x4, forgetful at times.   GI/: Adequate urine output, using urinal at bedside. Continent both bladder and bowel. No BM this shift  Skin/Wounds: Skin intact, no issue found during 2 RN skin assessment.  Lines/Drains: R AC PIV infusing Heparin gtt at 1500 units/hr and NS at 125 mL/hr.  Activity: Up with SBA, urinal at bedside.  Sleep: Slept between cares.   Abnormal Labs: Heparin Xa > 1.10. Heparin gtt paused and rate decreased to 1500 units/hr. Next Xa check at 1213.    Aggression Stop Light: Green          Patient Care Plan: Heparin gtt infusing @ 1500 units/hr, NS @ 125 mL/hr. NPO at after midnight. Possible IR procedure today. Continue with plan of care.

## 2024-08-26 NOTE — H&P
[facility]    History and Physical - Hospitalist Service       Date of Admission:  8/25/2024    Assessment & Plan      Magan Montanez is a 79 year old male admitted on 8/25/2024. He presents with     Saddle PE  NSTEMI, likely type 2  W/ increasing SOB today w/ ambulation, worsening throughout the day. No cp. Breathing ok at rest. . In ED mildly tachy, hypertensive. O2 sats normal. Troponin 293.   *CT PE w/ saddle PE w/ e/o R heart strain. . US legs w/ nonocclusive L popliteal DVT.   *son reportedly has PFO and has had small strokes from this  - IMC  - NPO, IV fluids  - heparin high intensity  - IR consult for am  - hematology consult    HTN  HLD  - hold BP meds  - resume statin    Mild cognitive impairment  Per previous notes.     Tiny pulmonary nodules  No tobacco hx, no need for f/up.         Diet: NPO for Medical/Clinical Reasons Except for: Meds, Ice ChipsNPO, IV fluids  DVT Prophylaxis: heparin gtt  Owen Catheter: Not present  Lines: None     Cardiac Monitoring: None  Code Status: Full CodeFull    Clinically Significant Risk Factors Present on Admission                # Drug Induced Platelet Defect: home medication list includes an antiplatelet medication   # Hypertension: Home medication list includes antihypertensive(s)         # Obesity: Estimated body mass index is 30.32 kg/m  as calculated from the following:    Height as of an earlier encounter on 8/25/24: 1.829 m (6').    Weight as of an earlier encounter on 8/25/24: 101.4 kg (223 lb 8.7 oz).                    Disposition Plan     Medically Ready for Discharge: Anticipated in 2-4 Days           Chiki Jaquez MD  Hospitalist Service  [facility]  Securely message with [employer] (more info)  Text page via [employer] Paging/Directory     ______________________________________________________________________    Chief Complaint   BARRY    History is obtained from the patient, electronic health record, and  emergency department physician    History of Present Illness   Magan Montanez is a 79 year old male who presents with dyspnea on exertion.  He has a remarkably healthy 79-year-old gentleman with a history of hypertension and hyperlipidemia.  Wife states the patient has seemed a little tired recently.  Patient notes that 2 days ago he went for his usual morning walk which is an hour and he felt more fatigued than usual.  Yesterday he did okay but then today he was walking up the stairs in his home and he became very dyspneic.  This happened a couple more times and he decided to come to the emergency department.  He had no chest pain.  He has not noticed any lower extremity edema.  There is no recent travel.  On talking with his wife, his son has had 3 small strokes at the age of 44.  He apparently has a PFO.  Is unclear if hypercoagulable workup on him has been done.  He currently otherwise is doing well.  Denies chest pain or shortness of breath.  Denies nausea or vomiting.  Denies abdominal pain.      Past Medical History    Past Medical History:   Diagnosis Date    HLD (hyperlipidemia)     HTN (hypertension)        Past Surgical History   No past surgical history on file.    Prior to Admission Medications   Prior to Admission Medications   Prescriptions Last Dose Informant Patient Reported? Taking?   aspirin 81 MG tablet   Yes No   Sig: Take 81 mg by mouth   lisinopril (ZESTRIL) 5 MG tablet   Yes Yes   Sig: Take 5 mg by mouth daily.   simvastatin (ZOCOR) 20 MG tablet   Yes Yes   Sig: Take 20 mg by mouth at bedtime.      Facility-Administered Medications: None        Review of Systems    The 10 point Review of Systems is negative other than noted in the HPI or here.     Social History   I have reviewed this patient's social history and updated it with pertinent information if needed.  Social History     Tobacco Use    Smoking status: Never    Smokeless tobacco: Never   Substance Use Topics    Alcohol use: Yes      Alcohol/week: 0.0 standard drinks of alcohol     Comment: rarely    Drug use: No        Physical Exam   Vital Signs: Temp: 98.8  F (37.1  C) Temp src: Oral BP: (!) 166/103 Pulse: 95   Resp: 16 SpO2: 94 % O2 Device: None (Room air)    Weight: 0 lbs 0 oz    General Appearance: Alert, very pleasant, no distress  Respiratory: CTA B  Cardiovascular: RRR, no murmur. No edema  GI: soft, nt/nd  Skin: no rashes or lesions grossly    Other: CN grossly intact, OQUENDO      Medical Decision Making       60 MINUTES SPENT BY ME on the date of service doing chart review, history, exam, documentation & further activities per the note.      Data   ------------------------- PAST 24 HR DATA REVIEWED -----------------------------------------------    I have personally reviewed the following data over the past 24 hrs:    8.1  \   13.4   / 194     138 105 17.0 /  115 (H)   4.0 20 (L) 1.18 (H) \     Trop: 293 (HH) BNP: 209     INR:  N/A PTT:  N/A   D-dimer:  >20.00 (HH) Fibrinogen:  N/A       Imaging results reviewed over the past 24 hrs:   Recent Results (from the past 24 hour(s))   US Lower Extremity Venous Duplex Bilateral   Result Value    Radiologist flags DVT (AA)    Narrative    EXAM: US LOWER EXTREMITY VENOUS DUPLEX BILATERAL  LOCATION: [facility]  DATE: 8/25/2024    INDICATION: elevated d dimer  COMPARISON: None.  TECHNIQUE: Venous Duplex ultrasound of bilateral lower extremities with and without compression, augmentation and duplex. Color flow and spectral Doppler with waveform analysis performed.    FINDINGS: Exam includes the common femoral, femoral, popliteal veins as well as segmentally visualized deep calf veins and greater saphenous vein.     RIGHT: No deep vein thrombosis. No superficial thrombophlebitis. No popliteal cyst.    LEFT: There is nonocclusive DVT in the popliteal vein. No superficial thrombophlebitis. No popliteal cyst.      Impression    IMPRESSION:  1.  Nonocclusive left popliteal vein  DVT.  2.  No DVT in the right lower extremity.    [Critical Result: DVT]    Finding was identified on 8/25/2024 6:19 PM CDT.     Dr. Patricio was contacted by me on 8/25/2024 6:22 PM CDT and verbalized understanding of the critical result.    CT Chest Pulmonary Embolism w Contrast   Result Value    Radiologist flags New diagnosis of pulmonary embolism (AA)    Narrative    EXAM: CT CHEST PULMONARY EMBOLISM W CONTRAST  LOCATION: [facility]  DATE: 8/25/2024    INDICATION: elevate d dimer, SOB  COMPARISON: None.  TECHNIQUE: CT chest pulmonary angiogram during arterial phase injection of IV contrast. Multiplanar reformats and MIP reconstructions were performed. Dose reduction techniques were used.   CONTRAST: 84 mL Isovue 370    FINDINGS:  ANGIOGRAM CHEST: Normal caliber central pulmonary arteries. Saddle pulmonary embolism (series 6, image 135) and additional pulmonary arterial filling defects extending into all 5 lobes. Thoracic aorta is negative for dissection. RV/LV ratio is greater   than 1.    LUNGS AND PLEURA: Patent central airways. Mild bibasilar atelectasis and/or scarring. No focal consolidation or pleural effusion. Tiny pulmonary nodules measuring up to 0.4 cm in the right lung apex (series 7, image 54).    MEDIASTINUM/AXILLAE: Dilatation of the right ventricle. No thoracic lymphadenopathy.    CORONARY ARTERY CALCIFICATION: Cannot evaluate.    UPPER ABDOMEN: Hepatic cyst and smaller hypodensities which are too small to characterize.    MUSCULOSKELETAL: Thoracic spondylosis. No destructive osseous lesion.      Impression    IMPRESSION:  1.  Saddle pulmonary embolism extending into all 5 lobes with RV/LV ratio greater than 1 suggesting right heart strain.  2.  Few tiny pulmonary nodules. Per Fleischner Society guidelines, if the patient is at low risk for pulmonary malignancy, no dedicated imaging follow-up is required. If the patient is at high risk for pulmonary malignancy, consider  optional follow-up CT   chest in 12 months.    [Critical Result: New diagnosis of pulmonary embolism]    Finding was identified on 8/25/2024 6:20 PM CDT.     Dr. Patricio was contacted by me on 8/25/2024 6:22 PM CDT and verbalized understanding of the critical result.

## 2024-08-26 NOTE — PLAN OF CARE
Patient Name: Gerard  MRN: [id number]  Date of Admission: 8/25/2024  Reason for Admission: Saddle PE  Level of Care: [facility]    Vitals:   BP Readings from Last 1 Encounters:  08/26/24 : 128/84    Pulse Readings from Last 1 Encounters:  08/26/24 : 71    Wt Readings from Last 1 Encounters:  08/25/24 : 101.4 kg (223 lb 8.7 oz)    Ht Readings from Last 1 Encounters:  08/25/24 : 1.829 m (6')    Estimated body mass index is 30.32 kg/m  as calculated from the following:    Height as of an earlier encounter on 8/25/24: 1.829 m (6').    Weight as of an earlier encounter on 8/25/24: 101.4 kg (223 lb 8.7 oz).  Temp Readings from Last 1 Encounters:  08/26/24 : 97.9  F (36.6  C) (Oral)      Pain: Pain Rating 3 Effective pain medication/regimen tylenol    Assessment: Pt had thrombectomy procedure and returned to room approx 1230. Bedrest until 4pm.     Resp: WDL  Telemetry: SR, inverted t wave  Neuro: WDL. Weak palpable pulses.  GI/: Voiding +  Skin/Wounds: Right groin site CDI  Lines/Drains: IV  Activity: Bedrest til 4pm. Up with SBA.  Abnormal Labs: Hep Xa recheck level at 2130.    Aggression Stop Light: Green          Patient Care Plan: Will meet with hematology tomorrow to transition to oral anticoags. IV hep gtt continued at 1200 units/hour.

## 2024-08-26 NOTE — CONSULTS
Interventional Radiology - Pre-Procedure Note:  Inpatient - [facility]  08/26/2024     Procedure Requested: PE thrombectomy  Requested by: Dr Chiki Jaquez     Brief HPI: Magan Montanez is a 79 year old male with a history of single kidney, hypertension and hyperlipidemia who was admitted 8/25/24 with shortness of breath and found to have bilateral PE with suggestion of right heart strain and small left DVT.     A PE thrombectomy has been requested.     Magan was seen in his room today. The SOB started 3 days ago and became worse until yesterday he couldn't walk up the steps in his house without significant SOB and needing to rest. He typically walks daily for an hour and doesn't have these issues with SOB. He denies chest pain, wheezing, cough, chest tightness or leg swelling or pain. Procedure was explained to him in detail and he is interested in having the procedure done.     ASSESSMENT/PLAN:Magan Montanez is a 79 year old male with a history of single kidney, hypertension and hyperlipidemia who was admitted 8/25/24 with shortness of breath and found to have bilateral PE with suggestion of right heart strain and small left DVT.     Reviewed imaging and chart with IR Dr Mak who feels that IR should proceed with a thrombectomy if patient is in agreement. There was a more significant Troponin level of 233 on admission which is concerning for right heart strain. The patient is also hypertensive.     -Continue NPO. May have ice chips  -May have medications with sips of water  -On call to IR on a cart when called for     IMAGING:    EXAM: CT CHEST PULMONARY EMBOLISM W CONTRAST  LOCATION: [facility]  DATE: 8/25/2024     INDICATION: elevate d dimer, SOB    IMPRESSION:  1.  Saddle pulmonary embolism extending into all 5 lobes with RV/LV ratio greater than 1 suggesting right heart strain.  2.  Few tiny pulmonary nodules. Per Fleischner Society guidelines, if the patient is at low  risk for pulmonary malignancy, no dedicated imaging follow-up is required. If the patient is at high risk for pulmonary malignancy, consider optional follow-up CT   chest in 12 months.    EXAM: US LOWER EXTREMITY VENOUS DUPLEX BILATERAL  LOCATION: [facility]  DATE: 8/25/2024     INDICATION: elevated d dimer    IMPRESSION:  1.  Nonocclusive left popliteal vein DVT.  2.  No DVT in the right lower extremity.    NPO: Yes  ANTICOAGULANTS: Yes, IV Hepar    Past Medical History:   Diagnosis Date    HLD (hyperlipidemia)     HTN (hypertension)      Medications Prior to Admission   Medication Sig Dispense Refill Last Dose    lisinopril (ZESTRIL) 5 MG tablet Take 5 mg by mouth daily.   8/25/2024    loperamide (IMODIUM) 2 MG capsule Take 2 mg by mouth daily.   8/25/2024    Pediatric Multivit-Minerals (GUMMY VITAMINS & MINERALS) chewable tablet Take 2 chew tab by mouth every evening.   8/24/2024    simvastatin (ZOCOR) 20 MG tablet Take 20 mg by mouth at bedtime.   8/24/2024     ALLERGIES  Allergies   Allergen Reactions    Penicillins     Sulfa Antibiotics      LABS:    EXAM:  ROUTINE ICU LABS (Last four results)  CMP  Recent Labs   Lab 08/25/24  2240 08/25/24  1603   NA  --  138   POTASSIUM  --  4.0   CHLORIDE  --  105   CO2  --  20*   ANIONGAP  --  13   * 115*   BUN  --  17.0   CR  --  1.18*   GFRESTIMATED  --  64   CHANO  --  8.9     CBC  Recent Labs   Lab 08/25/24  2318 08/25/24  1835 08/25/24  1603   WBC 9.1 8.1 8.4   RBC 4.65 4.81 5.01   HGB 13.1* 13.4 14.1   HCT 39.5* 41.9 43.0   MCV 85 87 86   MCH 28.2 27.9 28.1   MCHC 33.2 32.0 32.8   RDW 12.1 11.9 12.0    194 217     INRNo lab results found in last 7 days.  Arterial Blood Gas  Recent Labs   Lab 08/25/24  1603   O2PER 0     General: Pleasant, talkative, mildly anxious male in no acute distress.    Neuro:  A&O x 4. Moves all extremities equally.  Resp:  Lungs clear to auscultation bilaterally.  Cardio:  S1S2 and reg, without murmur,  clicks or rubs  Vascular: +2/4 bilateral dorsalis pedis pulses  -No swelling lower extremities bilaterally   Skin:  Without excoriations, ecchymosis, erythema, lesions or open sores.  MSK:  No gross motor weakness.  Sensation intact.  Proprioception intact.    Pre-Sedation Code Status Assessment:  Code Status: Full Code intra procedure, per chart review. .     History and Physical Reviewed: H&P documented within 30 days.  I have personally reviewed the patient's medical history and have updated the medical record as necessary.    Procedural education reviewed with patient/family in detail including, but not limited to risks, benefits and alternatives with understanding verbalized by patient/family.    Total time spent on the date of the encounter: 40 minutes.    Thanks [facility] Interventional Radiology CNP ([phone]) (phone [phone])

## 2024-08-26 NOTE — DISCHARGE INSTRUCTIONS
Routine, Mechanical Thrombectomy Procedure:     This procedure is used to help when people have a bloodclot(s) blocking one or more of the large blood vessels within the body. It involves inserting a catheter (thin flexible tube) with a device into the blood stream so that clot within blood vessel can be removed. This treatment can reduce the long-term effects of clot burden. Please follow the below instructions as you recover:     Care instructions after angiogram procedure:   - Do not lift objects greater than 10 pounds for 2 days following the procedure.   - Avoid excessive exercise and straining for 2 days. Avoid tub baths, pools, hot tubs and Jacuzzis for 3 days or until procedure site is well healed.   - You may shower beginning tomorrow. Do not scrub procedure site until well healed; pat dry.   - Return to yournormal activities as you tolerate after the 2 day restriction.   - You can expect to return to work 1-2 days after your procedure depending on the nature of your profession.   - It is normal to have sometenderness and minimal swelling at procedure puncture site. A small area of discoloration may be present. Tenderness typically subsides in 1-2 days. A small knot may also be present at puncture site for 6-8 weeks. This can be a normal part of the healing process.     Please seek medical evaluation for:   - If you develop fevers (greater than 101 F (38.3C)).   - If you develop increasing pain, redness, purulent drainage,tenderness, or swelling at procedure site.   - If you experience any bleeding from procedure/puncture site: lie down, firmly apply pressure to puncture site and call 911. - Seek emergent evaluation if you experience any new leg/arm pain, discoloration or numbness.   - Increasing shortness of breath.   - Increasing swelling or pain within your arms/legs.     You will receive a call from the [facility] to help coordinate a clinic visit and US in one month with the IR provider who  did your procedure. If you had an IVC filter placed removal will be discussed at the time of your clinic visit.     Please call Mona BRIAN RN clinician at  or Carmen BRIAN NP at  for questions or concerns about the procedure or post-procedure care. You can leave a voicemail. We work Monday through Friday [phone] or [phone]. An alternative number to call for questions is Interventional Radiology at

## 2024-08-26 NOTE — PRE-PROCEDURE
GENERAL PRE-PROCEDURE:   Procedure:  Bilateral pulmonary angiogram with thrombectomy with moderate sedation  Date/Time:  8/26/2024 10:04 AM    Written consent obtained?: Yes    Risks and benefits: Risks, benefits and alternatives were discussed    Consent given by:  Patient  Patient states understanding of procedure being performed: Yes    Patient's understanding of procedure matches consent: Yes    Procedure consent matches procedure scheduled: Yes    Expected level of sedation:  Moderate  Appropriately NPO:  Yes  ASA Class:  3  Mallampati  :  Grade 3- soft palate visible, posterior pharyngeal wall not visible  Lungs:  Lungs clear with good breath sounds bilaterally  Heart:  Normal heart sounds and rate  History & Physical reviewed:  History and physical reviewed and no updates needed  Statement of review:  I have reviewed the lab findings, diagnostic data, medications, and the plan for sedation

## 2024-08-26 NOTE — IR NOTE
[facility]  POST PROCEDURE NOTE    Procedure: Right CFV access with 24 Fr sheath.  Bilateral PA thrombectomy with more thrombus R>L.  Tolerated well.  No complications.  Continue heparin drip.    Physician: Obdulio  POST Procedure PA pressures:  43/16, MAP of 27.    Type of Sedation: Sedation    Estimated Blood Loss: 80 ml    Complications/Reactions: None    Findings: Please see above.    Plan: Bedrest and heparin.      Lukas Alexander MD   8/26/2024, 5:53 PM

## 2024-08-26 NOTE — PHARMACY-ADMISSION MEDICATION HISTORY
Pharmacist Admission Medication History    Admission medication history is complete. The information provided in this note is only as accurate as the sources available at the time of the update.    Information Source(s): Patient and CareEverywhere/SureScripts via in-person    Pertinent Information: None    Changes made to PTA medication list:  Added: all to list   Deleted: None  Changed: None    Allergies reviewed with patient and updates made in EHR: yes    Medication History Completed By: Carey Cabrera RPH 8/25/2024 10:55 PM    PTA Med List   Medication Sig Last Dose    lisinopril (ZESTRIL) 5 MG tablet Take 5 mg by mouth daily. 8/25/2024    loperamide (IMODIUM) 2 MG capsule Take 2 mg by mouth daily. 8/25/2024    Pediatric Multivit-Minerals (GUMMY VITAMINS & MINERALS) chewable tablet Take 2 chew tab by mouth every evening. 8/24/2024    simvastatin (ZOCOR) 20 MG tablet Take 20 mg by mouth at bedtime. 8/24/2024

## 2024-08-26 NOTE — PROGRESS NOTES
[facility]    Medicine Progress Note - Hospitalist Service    Date of Admission:  8/25/2024    Assessment & Plan   Magan Montanez is a 79 year old male past medical history seen for hypertension, hyperlipidemia and single kidney presenting for evaluation of shortness of breath over the last few weeks, on arrival to ER with  mildly tachycardic, with sats of 93%.  Based on the vitals and his story without any chest pain, infectious symptoms, D-dimer was sent which was greater than 20 after EKG showed no overt ischemic changes, he does have some Q waves in lead III but no inverted T waves.  CT PE study confirmed saddle PE with right heart strain, his troponin moderately elevated at 290 though he has not had any chest pain, BNP is normal.    ## Acute bilateral saddle emboli with RV strain  ## Non occlusive LLE Popliteal DVT  Patient was admitted overnight and has been hemodynamically stable.  He was started on IV heparin.  To IR for mechanical thrombectomy +/- IVC filter  Continue IV heparin post thrombectomy  Transition to DOAC, timing per IR  Hematology consulted but in context of such significant PE, would likely be a candidate for life-long anticoagulation.    ## NSTEMI type II demand ischemia  Patient presents with elevated troponin in context of bilateral PE and normal BNP, without chest pain and EKG that does not show active ischemic changes.  Serial troponin  Will check TTE  Monitor on telemetry    ## Hypertension  ## Hyperlipidemia  Patient with elevated blood pressures in  the 150 and 90-100s since admission.  Continue Zocor 20 mg daily  Await post thrombectomy hemodynamics  Patient is on low dose Lisinopril of 5 mg daily.  Will make IV hydralazine available for now.       Diet: NPO for Medical/Clinical Reasons Except for: Meds, Ice Chips    DVT Prophylaxis: IV heparin  Owen Catheter: Not present  Lines: PRESENT             Cardiac Monitoring: ACTIVE order. Indication: IMC  Code  Status: Full Code      Clinically Significant Risk Factors Present on Admission                  # Hypertension: Home medication list includes antihypertensive(s)         # Obesity: Estimated body mass index is 30.32 kg/m  as calculated from the following:    Height as of an earlier encounter on 8/25/24: 1.829 m (6').    Weight as of an earlier encounter on 8/25/24: 101.4 kg (223 lb 8.7 oz).             Disposition Plan     Medically Ready for Discharge: Anticipated in 2-4 Days      Brando Selby MD  Hospitalist Service  [facility]  Securely message with [employer]) (more info)  Text page via [employer] Paging/Directory   ______________________________________________________________________    Interval History   -- chart reviewed  -- patient to IR for thrombectomy  -- overnight actually hypertensive and not tachycardic, on 2L n/c    Physical Exam   Vital Signs: Temp: 97.9  F (36.6  C) Temp src: Oral BP: (!) 154/92 Pulse: 72   Resp: 16 SpO2: 97 % O2 Device: Nasal cannula Oxygen Delivery: 2 LPM  Weight: 0 lbs 0 oz    General Appearance: NAD, off oxygen  Respiratory: CTA  Cardiovascular: RRR  GI: soft, obese, ND  Skin: warm and dry  Other: No LE edema     Medical Decision Making       45 MINUTES SPENT BY ME on the date of service doing chart review, history, exam, documentation & further activities per the note.      Data     I have personally reviewed the following data over the past 24 hrs:    9.1  \   13.1 (L)   / 195     138 105 17.0 /  116 (H)   4.0 20 (L) 1.18 (H) \     Trop: 293 () BNP: 209     INR:  N/A PTT:  N/A   D-dimer:  >20.00 () Fibrinogen:  N/A       Imaging results reviewed over the past 24 hrs:   Recent Results (from the past 24 hour(s))   US Lower Extremity Venous Duplex Bilateral   Result Value    Radiologist flags DVT (AA)    Narrative    EXAM: US LOWER EXTREMITY VENOUS DUPLEX BILATERAL  LOCATION: [facility]  DATE: 8/25/2024    INDICATION: elevated d  dimer  COMPARISON: None.  TECHNIQUE: Venous Duplex ultrasound of bilateral lower extremities with and without compression, augmentation and duplex. Color flow and spectral Doppler with waveform analysis performed.    FINDINGS: Exam includes the common femoral, femoral, popliteal veins as well as segmentally visualized deep calf veins and greater saphenous vein.     RIGHT: No deep vein thrombosis. No superficial thrombophlebitis. No popliteal cyst.    LEFT: There is nonocclusive DVT in the popliteal vein. No superficial thrombophlebitis. No popliteal cyst.      Impression    IMPRESSION:  1.  Nonocclusive left popliteal vein DVT.  2.  No DVT in the right lower extremity.    [Critical Result: DVT]    Finding was identified on 8/25/2024 6:19 PM CDT.     Dr. Patricio was contacted by me on 8/25/2024 6:22 PM CDT and verbalized understanding of the critical result.    CT Chest Pulmonary Embolism w Contrast   Result Value    Radiologist flags New diagnosis of pulmonary embolism (AA)    Narrative    EXAM: CT CHEST PULMONARY EMBOLISM W CONTRAST  LOCATION: [facility]  DATE: 8/25/2024    INDICATION: elevate d dimer, SOB  COMPARISON: None.  TECHNIQUE: CT chest pulmonary angiogram during arterial phase injection of IV contrast. Multiplanar reformats and MIP reconstructions were performed. Dose reduction techniques were used.   CONTRAST: 84 mL Isovue 370    FINDINGS:  ANGIOGRAM CHEST: Normal caliber central pulmonary arteries. Saddle pulmonary embolism (series 6, image 135) and additional pulmonary arterial filling defects extending into all 5 lobes. Thoracic aorta is negative for dissection. RV/LV ratio is greater   than 1.    LUNGS AND PLEURA: Patent central airways. Mild bibasilar atelectasis and/or scarring. No focal consolidation or pleural effusion. Tiny pulmonary nodules measuring up to 0.4 cm in the right lung apex (series 7, image 54).    MEDIASTINUM/AXILLAE: Dilatation of the right ventricle. No  thoracic lymphadenopathy.    CORONARY ARTERY CALCIFICATION: Cannot evaluate.    UPPER ABDOMEN: Hepatic cyst and smaller hypodensities which are too small to characterize.    MUSCULOSKELETAL: Thoracic spondylosis. No destructive osseous lesion.      Impression    IMPRESSION:  1.  Saddle pulmonary embolism extending into all 5 lobes with RV/LV ratio greater than 1 suggesting right heart strain.  2.  Few tiny pulmonary nodules. Per Fleischner Society guidelines, if the patient is at low risk for pulmonary malignancy, no dedicated imaging follow-up is required. If the patient is at high risk for pulmonary malignancy, consider optional follow-up CT   chest in 12 months.    [Critical Result: New diagnosis of pulmonary embolism]    Finding was identified on 8/25/2024 6:20 PM CDT.     Dr. Patricio was contacted by me on 8/25/2024 6:22 PM CDT and verbalized understanding of the critical result.

## 2024-08-27 VITALS
TEMPERATURE: 98 F | SYSTOLIC BLOOD PRESSURE: 145 MMHG | OXYGEN SATURATION: 98 % | HEART RATE: 79 BPM | RESPIRATION RATE: 17 BRPM | DIASTOLIC BLOOD PRESSURE: 96 MMHG

## 2024-08-27 LAB
UFH PPP CHRO-ACNC: 0.68 IU/ML
UFH PPP CHRO-ACNC: 0.7 IU/ML

## 2024-08-27 PROCEDURE — 36415 COLL VENOUS BLD VENIPUNCTURE: CPT | Performed by: INTERNAL MEDICINE

## 2024-08-27 PROCEDURE — 250N000013 HC RX MED GY IP 250 OP 250 PS 637: Performed by: INTERNAL MEDICINE

## 2024-08-27 PROCEDURE — 85520 HEPARIN ASSAY: CPT | Performed by: INTERNAL MEDICINE

## 2024-08-27 PROCEDURE — 99239 HOSP IP/OBS DSCHRG MGMT >30: CPT | Performed by: INTERNAL MEDICINE

## 2024-08-27 RX ADMIN — RIVAROXABAN 15 MG: 15 TABLET, FILM COATED ORAL at 12:35

## 2024-08-27 ASSESSMENT — ACTIVITIES OF DAILY LIVING (ADL)
ADLS_ACUITY_SCORE: 23
ADLS_ACUITY_SCORE: 25
ADLS_ACUITY_SCORE: 25
ADLS_ACUITY_SCORE: 23
ADLS_ACUITY_SCORE: 25
ADLS_ACUITY_SCORE: 23

## 2024-08-27 NOTE — PROGRESS NOTES
Interventional Radiology Progress Note:  Inpatient at [facility]  Date: August 27, 2024     HPI: Magan Montanez is a 79 year old male with a history of single kidney, hypertension and hyperlipidemia who was admitted 8/25/24 with shortness of breath and found to have bilateral PE with suggestion of right heart strain and small left DVT.     He is s/p PE thrombectomy 8/26/24 with more thrombus R > L removed. He is being seen in IR follow up today.     Interval History:     Physical Exam:   Vitals: Temp:  [97.6  F (36.4  C)-98.6  F (37  C)] 98  F (36.7  C)  Pulse:  [67-86] 81  Resp:  [12-17] 17  BP: (124-166)/(74-97) 124/74  SpO2:  [92 %-99 %] 98 %    General:  Stable.  In no acute distress.    Neuro:  A&O x 3. Moves all extremities equally.  Resp:  Normal respirations on room air. Non labored breathing. Equal air entry B/L   Abdomen:  Soft, non-distended, non-tender.  Vascular: right groin procedure site with dressing CDI. Site soft without tenderness.    Labs:  ROUTINE ICU LABS (Last four results)  CMP  Recent Labs   Lab 08/25/24  2240 08/25/24  1603   NA  --  138   POTASSIUM  --  4.0   CHLORIDE  --  105   CO2  --  20*   ANIONGAP  --  13   * 115*   BUN  --  17.0   CR  --  1.18*   GFRESTIMATED  --  64   CHANO  --  8.9     CBC  Recent Labs   Lab 08/25/24  2318 08/25/24  1835 08/25/24  1603   WBC 9.1 8.1 8.4   RBC 4.65 4.81 5.01   HGB 13.1* 13.4 14.1   HCT 39.5* 41.9 43.0   MCV 85 87 86   MCH 28.2 27.9 28.1   MCHC 33.2 32.0 32.8   RDW 12.1 11.9 12.0    194 217     INRNo lab results found in last 7 days.  Arterial Blood Gas  Recent Labs   Lab 08/25/24  1603   O2PER 0     Assessment:     Plan:     Total time spent on the date of the encounter is 30 minutes, including time spent counseling the patient, performing a medically appropriate evaluation, reviewing prior medical history, ordering medications and tests, documenting clinical information in the medical record, and communication of  results.    Thanks [facility] Interventional Radiology CNP ([phone]) (phone [phone])

## 2024-08-27 NOTE — PLAN OF CARE
Pt discharged home with wife this afternoon. PIV removed. All belongings sent home with the patient. Discharge medications and discharge instructions reviewed with the patient and spouse. Patient and wife verbalized understanding at the time of discharge. Will follow up in clinic.

## 2024-08-27 NOTE — PLAN OF CARE
Goal Outcome Evaluation:    A/O-forgetful, VSS, SR w/ T wave inversion.  LS clear, + BS, + void of small amounts at a time.  Denies pain, SBA.  Plan for transition to DOAC. Pt eager for discharge.

## 2024-08-27 NOTE — DISCHARGE SUMMARY
[facility]  Hospitalist Discharge Summary      Date of Admission:  8/25/2024  Date of Discharge:  8/27/2024  Discharging Provider: Brando Selby MD  Discharge Service: Hospitalist Service    Discharge Diagnoses   Magan Montanez is a 79 year old male past medical history seen for hypertension, hyperlipidemia and single kidney presenting for evaluation of shortness of breath over the last few weeks, on arrival to ER with  mildly tachycardic, with sats of 93%.  Based on the vitals and his story without any chest pain, infectious symptoms, D-dimer was sent which was greater than 20 after EKG showed no overt ischemic changes, he does have some Q waves in lead III but no inverted T waves.  CT PE study confirmed saddle PE with right heart strain, his troponin moderately elevated at 290 though he has not had any chest pain, BNP is normal.     ## Acute bilateral saddle emboli with RV strain  ## Non occlusive LLE Popliteal DVT  Patient was admitted overnight and has been hemodynamically stable.  He was started on IV heparin.  To IR for mechanical thrombectomy +/- IVC filter  Continue IV heparin post thrombectomy  Transition to DOAC, timing per IR  Hematology consulted but in context of such significant PE, would likely be a candidate for life-long anticoagulation.     ## NSTEMI type II demand ischemia  Patient presents with elevated troponin in context of bilateral PE and normal BNP, without chest pain and EKG that does not show active ischemic changes.  Serial troponin  Will check TTE  Monitor on telemetry     ## Hypertension  ## Hyperlipidemia  Patient with elevated blood pressures in  the 150 and 90-100s since admission.  Continue Zocor 20 mg daily  Await post thrombectomy hemodynamics  Patient is on low dose Lisinopril of 5 mg daily.  Will make IV hydralazine available for now.     Diet: NPO for Medical/Clinical Reasons Except for: Meds, Ice Chips    DVT Prophylaxis: IV heparin  Lexie  Catheter: Not present  Lines: PRESENT             Cardiac Monitoring: ACTIVE order. [place] [facility] [place] Status: Full Code             Clinically Significant Risk Factors Present on Admission                  # Hypertension: Home medication list includes antihypertensive(s)          # Obesity: Estimated body mass index is 30.32 kg/m  as calculated from the following:    Height as of an earlier encounter on 8/25/24: 1.829 m (6').    Weight as of an earlier encounter on 8/25/24: 101.4 kg (223 lb 8.7 oz).            Disposition Plan  Medically Ready for Discharge:    Clinically Significant Risk Factors     # Obesity: Estimated body mass index is 30.32 kg/m  as calculated from the following:    Height as of an earlier encounter on 8/25/24: 1.829 m (6').    Weight as of an earlier encounter on 8/25/24: 101.4 kg (223 lb 8.7 oz).       Follow-ups Needed After Discharge   Follow-up Appointments     Follow-up and recommended labs and tests       Follow up with Interventional radiology in 1 month, they will contact   you.    Follow up with outpatient hematology for hypercoagulability        {Additional follow-up instructions/to-do's for PCP  and IR    Unresulted Labs Ordered in the Past 30 Days of this Admission       No orders found from 7/26/2024 to 8/26/2024.        These results will be followed up by PCP and IR    Discharge Disposition   Discharged to home  Condition at discharge: Stable    Hospital Course   Magan Montanez is a 79 year old male past medical history seen for hypertension, hyperlipidemia and single kidney presenting for evaluation of shortness of breath over the last few weeks, on arrival to ER with  mildly tachycardic, with sats of 93%.  Based on the vitals and his story without any chest pain, infectious symptoms, D-dimer was sent which was greater than 20 after EKG showed no overt ischemic changes, he does have some Q waves in lead III but no inverted T waves.  CT PE study confirmed saddle PE with  right heart strain, his troponin moderately elevated at 290 though he has not had any chest pain, BNP is normal.    ## Acute bilateral saddle emboli with RV strain  ## Non occlusive LLE Popliteal DVT  Patient was admitted overnight and has been hemodynamically stable.  He was started on IV heparin.  To IR for mechanical thrombectomy +/- IVC filter  Continue IV heparin post thrombectomy  Transition to DOAC today  Hematology consulted but in context of such significant PE, would likely be a candidate for life-long anticoagulation.  Follow up as outpatient.    ## NSTEMI type II demand ischemia  Patient presents with elevated troponin in context of bilateral PE and normal BNP, without chest pain and EKG that does not show active ischemic changes.  Serial troponin  Will check TTE  Monitor on telemetry    ## Hypertension  ## Hyperlipidemia  Patient with elevated blood pressures in  the 150 and 90-100s since admission.  Continue Zocor 20 mg daily  Await post thrombectomy hemodynamics  Patient is on low dose Lisinopril of 5 mg daily.         Cardiac Monitoring: ACTIVE order. Indication: IMC  Code Status: Full Code      Consultations This Hospital Stay   PHARMACY IP CONSULT  HEMATOLOGY & ONCOLOGY IP CONSULT  INTERVENTIONAL RADIOLOGY ADULT/PEDS IP CONSULT    Code Status   Full Code    Time Spent on this Encounter   I, Brando Selby MD, personally saw the patient today and spent greater than 30 minutes discharging this patient.       Brando Selby MD  [address] [place] [address]  Phone: [phone]  ______________________________________________________________________    Physical Exam   Vital Signs: Temp: 98  F (36.7  C) Temp src: Oral BP: (!) 145/96 Pulse: 79   Resp: 17 SpO2: 98 % O2 Device: None (Room air)    Weight: 0 lbs 0 oz         Primary Care Physician   Corey Kang [facility]    Discharge Orders      Reason for your hospital stay    Admit with PE and  DVT and underwent thrombectomy     Follow-up and recommended labs and tests     Follow up with Interventional radiology in 1 month, they will contact you.    Follow up with outpatient hematology for hypercoagulability     Activity    Your activity upon discharge: activity as tolerated     Diet    Follow this diet upon discharge: Current Diet:Orders Placed This Encounter      Regular Diet Adult       Significant Results and Procedures   Most Recent 3 CBC's:  Recent Labs   Lab Test 08/25/24  2318 08/25/24  1835 08/25/24  1603   WBC 9.1 8.1 8.4   HGB 13.1* 13.4 14.1   MCV 85 87 86    194 217     Most Recent 3 BMP's:  Recent Labs   Lab Test 08/25/24  2240 08/25/24  1603   NA  --  138   POTASSIUM  --  4.0   CHLORIDE  --  105   CO2  --  20*   BUN  --  17.0   CR  --  1.18*   ANIONGAP  --  13   CHANO  --  8.9   * 115*     Most Recent 2 LFT's:No lab results found.  Most Recent 3 INR's:No lab results found.,   Results for orders placed or performed during the hospital encounter of 08/25/24   IR Pulmonary Angiogram Bilateral    Narrative    INTERVENTIONAL RADIOLOGY PULMONARY ANGIOGRAM BILATERAL August 26, 2024  at 1209 hours    HISTORY: 79-year-old patient with shortness of breath and right heart  strain on CT exam. Patient had elevated troponin, plan is for  mechanical thrombectomy of the pulmonary arterial system. Patient is  noted to have small left lower extremity deep vein thrombosis in the  popliteal vein, given small clot burden, do not feel that IVC filter  is necessary.    TECHNIQUE: Patient was brought to the interventional radiology  department and informed consent obtained. Patient was placed in a  supine position. Skin overlying the right groin was prepped and draped  in standard sterile fashion. 1% lidocaine was used for local  anesthetic. Ultrasound was used to visualize the common femoral vein,  patency confirmed, and image stored for documentation. With continuous  ultrasound guidance, a  micropuncture kit was used to access the right  common femoral vein. Over a series of maneuvers, 6 right vascular  sheath was placed. ACT level was drawn and 4000 units of heparin was  given as a bolus. A pigtail catheter was advanced while formed through  the right atrium and right heart, with 90 degree pigtail catheter.  Once in the pulmonary arterial system, initial pulmonary angiogram was  not performed given patient's history of a single kidney. Nginx wire  was used to exchange the pigtail catheter for a Berenstein catheter  which was then directed into right lower lobe pulmonary arterial  system. The wire was exchanged for a superstiff Amplatz wire and a 24  Vietnamese sheath was placed in the right common femoral vein extending  into the IVC. Through this sheath, 24 Vietnamese FlowTriever was advanced  through the right lower lobe pulmonary arterial system where  mechanical thrombectomy was performed. Catheter was then pulled back  and redirected into the right upper lobe pulmonary arterial system are  mechanical thrombectomy was performed. The right pulmonary arterial  angiogram was performed after removal of right lower lobe thrombus,  though before removal of upper lobe thrombus. Again, in an effort to  minimize contrast administration, upper lobe thrombus was not  documented with completion pulmonary angiogram. The catheter was then  redirected into the left pulmonary arterial system, subsequently the  left lower lobe pulmonary arteries. A curved 20 Vietnamese FlowTriever  catheter was then used to remove a large amount of thrombus, all of  the central thrombus in the left pulmonary arterial system. Completion  pulmonary cholangiogram demonstrates good result without residual  central thrombus. There is a left lower lobe pulmonary arterial branch  with thrombus at completion, though this is difficult to access given  medial location. Overall, result is thought to be excellent, and  post-thrombectomy pressures were  found to be 43/16 in the main  pulmonary artery with map of 27. All sheaths and catheters were  removed and hemostasis was achieved with pursestring suture device in  the right common femoral vein. Additional manual pressure was applied  for 10 minutes.    Sedation: Moderate level of sedation was achieved with 4 mg IV Versed  and 200 mcg IV fentanyl. 4000 units of IV heparin was administered  during the procedure.  Sedation time: 94 minutes  Please note the above medications were administered by the  interventional radiology staff under my direct supervision. Patient's  vital signs were monitored and remained stable throughout the  procedure.  Fluoroscopic time: 20.4 minutes.  Air Kerma: 132.3 mGy.  Contrast: 40 mL of Isovue-300 administered in the pulmonary arterial  system without complication.  Local anesthesia: 10 mL of 1% lidocaine.    FINDINGS: Pulmonary angiogram performed from the right pulmonary  artery and separate angiogram from the left pulmonary artery  demonstrating good result. No residual central pulmonary emboli.  Scattered subsegmental branches noted, the good perfusion of both  lungs.      Impression    IMPRESSION: Successful thrombectomy of both pulmonary arterial  systems. Only scattered subsegmental pulmonary emboli at completion.  Recommend three hour bed rest, continued heparin drip, and plan for  pursestring suture removal in one to two days.       Discharge Medications   Current Discharge Medication List        START taking these medications    Details   rivaroxaban ANTICOAGULANT (XARELTO) 2.5 MG TABS tablet Take 6 tablets (15 mg) by mouth 2 times daily (with meals) for 21 days, THEN 8 tablets (20 mg) daily (with dinner).  Qty: 804 tablet, Refills: 0    Associated Diagnoses: Acute saddle pulmonary embolism with acute cor pulmonale (H)           CONTINUE these medications which have NOT CHANGED    Details   lisinopril (ZESTRIL) 5 MG tablet Take 5 mg by mouth daily.      loperamide (IMODIUM)  2 MG capsule Take 2 mg by mouth daily.      Pediatric Multivit-Minerals (GUMMY VITAMINS & MINERALS) chewable tablet Take 2 chew tab by mouth every evening.      simvastatin (ZOCOR) 20 MG tablet Take 20 mg by mouth at bedtime.           Allergies   Allergies   Allergen Reactions    Penicillins     Sulfa Antibiotics

## 2024-08-28 ENCOUNTER — HOSPITAL ENCOUNTER (OUTPATIENT)
Dept: GENERAL RADIOLOGY | Facility: CLINIC | Age: 79
Discharge: HOME | End: 2024-08-28
Attending: RADIOLOGY
Payer: COMMERCIAL

## 2024-08-28 DIAGNOSIS — I82.409 DVT (DEEP VENOUS THROMBOSIS) (H): Primary | ICD-10-CM

## 2024-08-28 DIAGNOSIS — I82.412 DVT OF DEEP FEMORAL VEIN, LEFT (H): ICD-10-CM

## 2024-08-28 DIAGNOSIS — Z48.02 VISIT FOR SUTURE REMOVAL: ICD-10-CM

## 2024-08-28 NOTE — PROGRESS NOTES
Right groin purse string suture removed per Dr. Alexander without difficulty, patient tolerated well. Sterile dressing applied. 20 minutes nurse time spent with patient.

## 2024-09-10 ENCOUNTER — TELEPHONE (OUTPATIENT)
Dept: OTHER | Facility: CLINIC | Age: 79
End: 2024-09-10
Payer: COMMERCIAL

## 2024-09-10 NOTE — TELEPHONE ENCOUNTER
[facility] for patient's wife, Aniyah [phone], (she requested to be the contact) for her to call and schedule imaging for her  US Lower Extremity Venous Duplex Left  as well as an appointment with Dr. Alexander on 10/14/24.      Per Mona MAYNARD, it is OK for the patient to be scheduled this day.    Appt Note: s/p left lower DVT, pulmonary thrombectomy done 8/26 with Dr. Alexander 1 mo f/u in clinic

## 2024-09-24 ENCOUNTER — OFFICE VISIT (OUTPATIENT)
Dept: URGENT CARE | Facility: URGENT CARE | Age: 79
End: 2024-09-24
Payer: COMMERCIAL

## 2024-09-24 VITALS
HEART RATE: 114 BPM | OXYGEN SATURATION: 98 % | TEMPERATURE: 102.5 F | DIASTOLIC BLOOD PRESSURE: 78 MMHG | SYSTOLIC BLOOD PRESSURE: 123 MMHG | RESPIRATION RATE: 18 BRPM

## 2024-09-24 DIAGNOSIS — R05.1 ACUTE COUGH: Primary | ICD-10-CM

## 2024-09-24 LAB
DEPRECATED S PYO AG THROAT QL EIA: NEGATIVE
FLUAV AG SPEC QL IA: NEGATIVE
FLUBV AG SPEC QL IA: NEGATIVE
GROUP A STREP BY PCR: NOT DETECTED

## 2024-09-24 PROCEDURE — 99203 OFFICE O/P NEW LOW 30 MIN: CPT | Performed by: PHYSICIAN ASSISTANT

## 2024-09-24 PROCEDURE — 87804 INFLUENZA ASSAY W/OPTIC: CPT | Performed by: PHYSICIAN ASSISTANT

## 2024-09-24 PROCEDURE — 87651 STREP A DNA AMP PROBE: CPT | Performed by: PHYSICIAN ASSISTANT

## 2024-09-24 PROCEDURE — 87635 SARS-COV-2 COVID-19 AMP PRB: CPT | Performed by: PHYSICIAN ASSISTANT

## 2024-09-24 NOTE — PROGRESS NOTES
SUBJECTIVE:   Magan Montanez is a 79 year old male presenting with a chief complaint of fever, chills, cough - productive, sore throat, and body aches.  Onset of symptoms was 1 day(s) ago.  Course of illness is same.    Severity moderate  Current and Associated symptoms: fever, chills, cough - productive, and sore throat  Treatment measures tried include OTC Cough med.  Predisposing factors include None.    Past Medical History:   Diagnosis Date    HLD (hyperlipidemia)     HTN (hypertension)      Current Outpatient Medications   Medication Sig Dispense Refill    lisinopril (ZESTRIL) 5 MG tablet Take 5 mg by mouth daily.      loperamide (IMODIUM) 2 MG capsule Take 2 mg by mouth daily.      Pediatric Multivit-Minerals (GUMMY VITAMINS & MINERALS) chewable tablet Take 2 chew tab by mouth every evening.      rivaroxaban ANTICOAGULANT (XARELTO) 2.5 MG TABS tablet Take 6 tablets (15 mg) by mouth 2 times daily (with meals) for 21 days, THEN 8 tablets (20 mg) daily (with dinner). 804 tablet 0    simvastatin (ZOCOR) 20 MG tablet Take 20 mg by mouth at bedtime.       Social History     Tobacco Use    Smoking status: Never    Smokeless tobacco: Never   Substance Use Topics    Alcohol use: Yes     Alcohol/week: 0.0 standard drinks of alcohol     Comment: rarely       ROS:  Review of systems negative except as stated above.    OBJECTIVE:  /78   Pulse 117   Temp (!) 102.5  F (39.2  C)   Resp 18   SpO2 95%   GENERAL APPEARANCE: healthy, alert and no distress  EYES: conjunctiva clear  HENT: Nose and mouth without ulcers, erythema or lesions  NECK: supple, nontender, no lymphadenopathy  RESP: lungs clear to auscultation - no rales, rhonchi or wheezes  CV: regular rates and rhythm, normal S1 S2, no murmur noted  NEURO: Normal strength and tone, sensory exam grossly normal,  normal speech and mentation  SKIN: no suspicious lesions or rashes      Results for orders placed or performed in visit on 09/24/24   Streptococcus A  Rapid Screen w/Reflex to PCR - Clinic Collect     Status: Normal    Specimen: Throat; Swab   Result Value Ref Range    Group A Strep antigen Negative Negative   Influenza A & B Antigen - Clinic Collect     Status: Normal    Specimen: Nose; Swab   Result Value Ref Range    Influenza A antigen Negative Negative    Influenza B antigen Negative Negative    Narrative    Test results must be correlated with clinical data. If necessary, results should be confirmed by a molecular assay or viral culture.         ASSESSMENT:  (R05.1) Acute cough  (primary encounter diagnosis)  Comment: recent diagnosis Saddle PE, highrisk for complications.  ED follow up with any worsening of symptoms  Plan: Streptococcus A Rapid Screen w/Reflex to PCR -         Clinic Collect, Symptomatic COVID-19 Virus         (Coronavirus) by PCR Nose, Influenza A & B         Antigen - Clinic Collect, Group A Streptococcus        PCR Throat Swab          Red flags and emergent follow up discussed, and understood by patient  Follow up with PCP if symptoms worsen or fail to improve

## 2024-09-25 LAB — SARS-COV-2 RNA RESP QL NAA+PROBE: POSITIVE

## 2024-10-14 ENCOUNTER — HOSPITAL ENCOUNTER (OUTPATIENT)
Dept: ULTRASOUND IMAGING | Facility: CLINIC | Age: 79
Discharge: HOME | End: 2024-10-14
Attending: RADIOLOGY
Payer: COMMERCIAL

## 2024-10-14 ENCOUNTER — OFFICE VISIT (OUTPATIENT)
Dept: OTHER | Facility: CLINIC | Age: 79
End: 2024-10-14
Attending: RADIOLOGY
Payer: COMMERCIAL

## 2024-10-14 DIAGNOSIS — I82.412 DVT OF DEEP FEMORAL VEIN, LEFT (H): ICD-10-CM

## 2024-10-14 DIAGNOSIS — Z86.711 HISTORY OF PULMONARY EMBOLISM: Primary | ICD-10-CM

## 2024-10-14 DIAGNOSIS — I82.409 DVT (DEEP VENOUS THROMBOSIS) (H): ICD-10-CM

## 2024-10-14 PROCEDURE — 93971 EXTREMITY STUDY: CPT | Mod: LT

## 2024-10-14 PROCEDURE — G0463 HOSPITAL OUTPT CLINIC VISIT: HCPCS | Performed by: RADIOLOGY

## 2024-10-14 NOTE — PROGRESS NOTES
[facility]        Patient is here for a follow up.    Pt is currently taking Xarelto.    There were no vitals taken for this visit.    The provider has been notified that the patient has no concerns.     Questions patient would like addressed today are: N/A.    Refills are needed: N/A    Has homecare services and agency name:  Zahra Moreno MA

## 2024-10-14 NOTE — PATIENT INSTRUCTIONS
Recommend follow up with hematology.  This referral needs to come from your PCP if you are going to stay with [facility].  We do recommend ECHO in 6 months.  The blood clot in your left leg has resolved.   Continue on Xarelto until follow up with hematology  We will no longer need to follow you unless concerns arise.  Mona Jackson RN  IR nurse clinician  [phone]

## 2024-10-15 VITALS — SYSTOLIC BLOOD PRESSURE: 131 MMHG | OXYGEN SATURATION: 97 % | HEART RATE: 72 BPM | DIASTOLIC BLOOD PRESSURE: 96 MMHG

## 2024-10-15 ASSESSMENT — PAIN SCALES - GENERAL: PAINLEVEL: NO PAIN (0)

## 2024-10-15 NOTE — PROGRESS NOTES
VASCULAR OUTPATIENT CONSULT OR VISIT  PHYSICIAN:  Dr. Lukas Alexander      LOCATION: [facility]    Magan Montanez   Medical Record #:  [id number]  YOB: 1945  Age:  79 year old     Date of Service: 10/14/2024    PRIMARY CARE PROVIDER: Zahra Ref-Primary, Physician      HPI:  Magan Montanez is a 79 year old male with a past history of hypertension, hyperlipidemia, single kidney who presented to [facility] emergency room on 8/25/2024 with shortness of breath.  The patient is quite active and walks every morning and noticed he was unable to finish his walk or climb stairs. CT chest PE was performed.  This showed a saddle pulmonary embolism extending into all 5 lobes with RV/LV ratio greater than 1 suggesting right heart strain. Bilateral lower extremity venous ultrasound showed nonocclusive left popliteal vein DVT. The patient was then transferred to [facility] where he underwent a successful thrombectomy of both pulmonary arterial systems.  The patient recovered well and was discharged to home on Xarelto.  He has had further follow up with his PCP.  Today, the patient states he is doing well.  He is walking a hour every day with no reports of shortness of breath.  His wife states, he does not have a appointment scheduled with hematology.    PHH:    Past Medical History:   Diagnosis Date    HLD (hyperlipidemia)     HTN (hypertension)         Past Surgical History:   Procedure Laterality Date    IR PULMONARY ANGIOGRAM BILATERAL  8/26/2024       ALLERGIES:  Penicillins and Sulfa antibiotics    MEDS:    Current Outpatient Medications:     lisinopril (ZESTRIL) 5 MG tablet, Take 5 mg by mouth daily., Disp: , Rfl:     loperamide (IMODIUM) 2 MG capsule, Take 2 mg by mouth daily., Disp: , Rfl:     Pediatric Multivit-Minerals (GUMMY VITAMINS & MINERALS) chewable tablet, Take 2 chew tab by mouth every evening., Disp: , Rfl:     rivaroxaban ANTICOAGULANT (XARELTO)  2.5 MG TABS tablet, Take 6 tablets (15 mg) by mouth 2 times daily (with meals) for 21 days, THEN 8 tablets (20 mg) daily (with dinner)., Disp: 804 tablet, Rfl: 0    simvastatin (ZOCOR) 20 MG tablet, Take 20 mg by mouth at bedtime., Disp: , Rfl:     SOCIAL HABITS:    History   Smoking Status    Never   Smokeless Tobacco    Never     Social History    Substance and Sexual Activity      Alcohol use: Yes        Alcohol/week: 0.0 standard drinks of alcohol        Comment: rarely      History   Drug Use No       FAMILY HISTORY:  No family history on file.    REVIEW OF SYSTEMS:    A 12 point ROS was reviewed and except for what is listed in the HPI above, all others are negative    PE:  BP (!) 131/96 (BP Location: Right arm)   Pulse 72   SpO2 97%   Wt Readings from Last 1 Encounters:   08/25/24 223 lb 8.7 oz (101.4 kg)     There is no height or weight on file to calculate BMI.    EXAM:  GENERAL: This is a well-developed 79 year old male who appears his stated age  EYES: Grossly normal.  MOUTH: Buccal mucosa normal   MUSCULOSKELETAL: Grossly normal and both lower extremities are intact.  HEME/LYMPH: No lymphedema  NEUROLOGIC: Focally intact, Alert and oriented x 3.   PSYCH: appropriate affect  INTEGUMENT: No open lesions or ulcers        DIAGNOSTIC STUDIES:     Images:  US Lower Extremity Venous Duplex Left    Result Date: 10/14/2024  VENOUS ULTRASOUND LEFT LOWER EXTREMITY October 14, 2024 3:02 PM HISTORY: Status post left lower DVT, pulmonary thrombectomy done 8/26 with Dr. Mak one month followup in clinic. DVT (deep venous thrombosis) (H). DVT of deep femoral vein, left (H). COMPARISON: August 25, 2024. TECHNIQUE: Color Doppler and spectral waveform analysis performed throughout the deep veins of the left lower extremity. FINDINGS: The visualized left external iliac, common femoral, proximal great saphenous, femoral, and popliteal veins demonstrate normal blood flow, compression, and augmentation. Posterior tibial  and peroneal veins are compressible. Contralateral right common femoral vein is patent.     IMPRESSION: Negative for deep vein thrombosis in the left lower extremity. OTIS ALEXANDER MD   SYSTEM ID:  [id number]        LABS:      Sodium   Date Value Ref Range Status   08/25/2024 138 135 - 145 mmol/L Final     Urea Nitrogen   Date Value Ref Range Status   08/25/2024 17.0 8.0 - 23.0 mg/dL Final     Hemoglobin   Date Value Ref Range Status   08/25/2024 13.1 (L) 13.3 - 17.7 g/dL Final   08/25/2024 13.4 13.3 - 17.7 g/dL Final   08/25/2024 14.1 13.3 - 17.7 g/dL Final     Platelet Count   Date Value Ref Range Status   08/25/2024 195 150 - 450 10e3/uL Final   08/25/2024 194 150 - 450 10e3/uL Final   08/25/2024 217 150 - 450 10e3/uL Final     Assessment/Plan:  This is a pleasant 79 year old male who underwent successful pulmonary thrombectomy on 8/6/2024.  The patient continues to do well with no reports of shortness of breath or chest pain.  He is back to full activity with no restrictions.  He is on Xarelto and is tolerating well.  We discussed the results of the left lower venous extremity that was performed today.  It is negative for deep vein thrombosis.   Plan: patient will need see hematology for further hypercoagulability workup and anticoagulation management.  The patient can continue with [facility], however if needed we can place a referral to Vascular Medicine.  Echocardiogram recommended in 6 months.     Dr. Otis Alexander MD  Interventional Radiology  Pager: [phone]  [facility]    This was a in person visit in which 40 minutes of  total time was spent (either in face-to-face or non-face-to-face time).

## 2025-03-05 ENCOUNTER — HOSPITAL ENCOUNTER (EMERGENCY)
Facility: CLINIC | Age: 80
Discharge: HOME | End: 2025-03-05
Attending: SOCIAL WORKER | Admitting: SOCIAL WORKER
Payer: COMMERCIAL

## 2025-03-05 VITALS
TEMPERATURE: 98.4 F | HEART RATE: 62 BPM | WEIGHT: 224.43 LBS | BODY MASS INDEX: 30.4 KG/M2 | OXYGEN SATURATION: 98 % | SYSTOLIC BLOOD PRESSURE: 146 MMHG | DIASTOLIC BLOOD PRESSURE: 85 MMHG | RESPIRATION RATE: 12 BRPM | HEIGHT: 72 IN

## 2025-03-05 DIAGNOSIS — G47.10 EXCESSIVE SLEEPINESS: ICD-10-CM

## 2025-03-05 DIAGNOSIS — R41.3 MEMORY CHANGES: ICD-10-CM

## 2025-03-05 DIAGNOSIS — R53.83 FATIGUE, UNSPECIFIED TYPE: ICD-10-CM

## 2025-03-05 PROBLEM — F43.29 ADJUSTMENT DISORDER WITH MIXED EMOTIONAL FEATURES: Status: ACTIVE | Noted: 2025-03-05

## 2025-03-05 LAB
ALBUMIN SERPL BCG-MCNC: 4.1 G/DL (ref 3.5–5.2)
ALP SERPL-CCNC: 74 U/L (ref 40–150)
ALT SERPL W P-5'-P-CCNC: 20 U/L (ref 0–70)
ANION GAP SERPL CALCULATED.3IONS-SCNC: 9 MMOL/L (ref 7–15)
AST SERPL W P-5'-P-CCNC: 20 U/L (ref 0–45)
BASOPHILS # BLD AUTO: 0.1 10E3/UL (ref 0–0.2)
BASOPHILS NFR BLD AUTO: 2 %
BILIRUB SERPL-MCNC: 0.3 MG/DL
BUN SERPL-MCNC: 16.3 MG/DL (ref 8–23)
CALCIUM SERPL-MCNC: 9.2 MG/DL (ref 8.8–10.4)
CHLORIDE SERPL-SCNC: 105 MMOL/L (ref 98–107)
CREAT SERPL-MCNC: 1.29 MG/DL (ref 0.67–1.17)
EGFRCR SERPLBLD CKD-EPI 2021: 57 ML/MIN/1.73M2
EOSINOPHIL # BLD AUTO: 0.3 10E3/UL (ref 0–0.7)
EOSINOPHIL NFR BLD AUTO: 5 %
ERYTHROCYTE [DISTWIDTH] IN BLOOD BY AUTOMATED COUNT: 12.3 % (ref 10–15)
GLUCOSE SERPL-MCNC: 95 MG/DL (ref 70–99)
HCO3 SERPL-SCNC: 25 MMOL/L (ref 22–29)
HCT VFR BLD AUTO: 41.9 % (ref 40–53)
HGB BLD-MCNC: 14 G/DL (ref 13.3–17.7)
HOLD SPECIMEN: NORMAL
IMM GRANULOCYTES # BLD: 0 10E3/UL
IMM GRANULOCYTES NFR BLD: 0 %
LYMPHOCYTES # BLD AUTO: 1.9 10E3/UL (ref 0.8–5.3)
LYMPHOCYTES NFR BLD AUTO: 28 %
MCH RBC QN AUTO: 28.2 PG (ref 26.5–33)
MCHC RBC AUTO-ENTMCNC: 33.4 G/DL (ref 31.5–36.5)
MCV RBC AUTO: 85 FL (ref 78–100)
MONOCYTES # BLD AUTO: 0.6 10E3/UL (ref 0–1.3)
MONOCYTES NFR BLD AUTO: 8 %
NEUTROPHILS # BLD AUTO: 4 10E3/UL (ref 1.6–8.3)
NEUTROPHILS NFR BLD AUTO: 58 %
NRBC # BLD AUTO: 0 10E3/UL
NRBC BLD AUTO-RTO: 0 /100
NT-PROBNP SERPL-MCNC: <36 PG/ML (ref 0–1800)
PLATELET # BLD AUTO: 244 10E3/UL (ref 150–450)
POTASSIUM SERPL-SCNC: 4.4 MMOL/L (ref 3.4–5.3)
PROT SERPL-MCNC: 6.8 G/DL (ref 6.4–8.3)
RBC # BLD AUTO: 4.96 10E6/UL (ref 4.4–5.9)
SODIUM SERPL-SCNC: 139 MMOL/L (ref 135–145)
WBC # BLD AUTO: 6.9 10E3/UL (ref 4–11)

## 2025-03-05 PROCEDURE — 36415 COLL VENOUS BLD VENIPUNCTURE: CPT | Performed by: SOCIAL WORKER

## 2025-03-05 PROCEDURE — 85025 COMPLETE CBC W/AUTO DIFF WBC: CPT | Performed by: SOCIAL WORKER

## 2025-03-05 PROCEDURE — 80048 BASIC METABOLIC PNL TOTAL CA: CPT | Performed by: SOCIAL WORKER

## 2025-03-05 PROCEDURE — 83880 ASSAY OF NATRIURETIC PEPTIDE: CPT | Performed by: SOCIAL WORKER

## 2025-03-05 PROCEDURE — 93005 ELECTROCARDIOGRAM TRACING: CPT

## 2025-03-05 PROCEDURE — 99285 EMERGENCY DEPT VISIT HI MDM: CPT | Mod: 25

## 2025-03-05 ASSESSMENT — COLUMBIA-SUICIDE SEVERITY RATING SCALE - C-SSRS
6. HAVE YOU EVER DONE ANYTHING, STARTED TO DO ANYTHING, OR PREPARED TO DO ANYTHING TO END YOUR LIFE?: NO
4. HAVE YOU HAD THESE THOUGHTS AND HAD SOME INTENTION OF ACTING ON THEM?: NO
2. HAVE YOU ACTUALLY HAD ANY THOUGHTS OF KILLING YOURSELF IN THE PAST MONTH?: YES
1. IN THE PAST MONTH, HAVE YOU WISHED YOU WERE DEAD OR WISHED YOU COULD GO TO SLEEP AND NOT WAKE UP?: NO

## 2025-03-05 ASSESSMENT — ACTIVITIES OF DAILY LIVING (ADL)
ADLS_ACUITY_SCORE: 55

## 2025-03-05 NOTE — ED TRIAGE NOTES
"Pt here with wife. Reports ongoing trend since at least August of forgetfulness and fatigue. Reports less tolerance for activity. States has been on xarelto since aug for PE. Denies pain. Reports intermittent dizziness. When asked SI screening pt reports a \"few\" thoughts of suicide, states \"If I needed to do it I would take one of my guns.\" Reports increased depression since hospitalization. Denies active thoughts/plan. A & Ox4 in triage      Triage Assessment (Adult)       Row Name 03/05/25 1700          Triage Assessment    Airway WDL WDL        Respiratory WDL    Respiratory WDL WDL        Cognitive/Neuro/Behavioral WDL    Cognitive/Neuro/Behavioral WDL X  pt wife noting pt to be increasingly forgetful and fatigued over time                     "

## 2025-03-06 LAB
ATRIAL RATE - MUSE: 61 BPM
DIASTOLIC BLOOD PRESSURE - MUSE: NORMAL MMHG
INTERPRETATION ECG - MUSE: NORMAL
P AXIS - MUSE: 38 DEGREES
PR INTERVAL - MUSE: 158 MS
QRS DURATION - MUSE: 84 MS
QT - MUSE: 414 MS
QTC - MUSE: 416 MS
R AXIS - MUSE: 31 DEGREES
SYSTOLIC BLOOD PRESSURE - MUSE: NORMAL MMHG
T AXIS - MUSE: 42 DEGREES
VENTRICULAR RATE- MUSE: 61 BPM

## 2025-03-06 NOTE — DISCHARGE INSTRUCTIONS
You were seen in the emergency department for fatigue, increasing confusion at home, shortness of breath while moving around. Your exam and workup was overall reassuring, we did not see any signs of acute emergency at this time. You did not want to stay for the urine.     Please keep your appointment on Friday.     Come back to the emergency department if you start to have chest pain, if you have a fever, severely short of breath, swelling in your legs, you feel like you want to hurt yourself, or any other concerning symptoms.     If you need help with a therapy referral or other mental health concerns, please call our behavioral coordination line at [phone].

## 2025-03-06 NOTE — CONSULTS
"Diagnostic Evaluation Consultation  Crisis Assessment    Patient Name: Magan Montanez  Age:  79 year old  Legal Sex: male  Gender Identity: male  Pronouns:   Race: White  Ethnicity: Not  or   Language: English      Patient was assessed: Virtual: [employer]   Crisis Assessment Start Date: 03/05/25  Crisis Assessment Start Time: 2011  Crisis Assessment Stop Time: 2046  Patient location: [facility] Emergency Dept                                 Referral Data and Chief Complaint  Magan Montanez presents to the ED with family/friends. Patient is presenting to the ED for the following concerns: Health stressors, Memory concerns. Factors that make the mental health crisis life threatening or complex are: Pt presents to the ED, brought in by his wife, for concerns of changes in memory and energy since starting a new medication. Pt reports having a major surgery in August of 2024 and has had a difficult time adjusting to his new lifestyle. Pt reports feeling more tired than usual and difficulty remembering things over the past few weeks. Pt states, \"There's not a whole lot for me to do. I go out for my walks everyday. I guess I've been feeling more down, sometimes sad, because I can't do the things I enjoy doing.\" When asked what the barriers to leisure activities were, pt states they are primarily financial barriers due to him and his wife planning several trips that they are no longer able to take. 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 Pt has been retired for a few years now, and identifies feeling a lack of fulfillment from not working full time anymore. Pt states that he does not feel he has depression due to his usual, \"upbeat, joking\" mood. Pt reports \"a few thoughts\" of suicide roughly two weeks ago. When asked about methods, pt states, \"I would grab my gun, go into the garage, and just end it.\" Pt then states that he would never do " "this if his wife was alive, and reports his marriage as a very strong deterrent to SI and symptoms of depression. Pt describes the nature of his SI as feeling that he would have nothing left to live for; He references friends of his that have passed away in nursing homes. Pt has strong protective factor of willingness to express his emotions to his wife and a group of friends that he meets with weekly. Pt is future oriented and speaks positively of his grandchildren and an upcoming weekend trip. Pt denies current SI, HI, AH/VH, DAVIN, and SIB.      Informed Consent and Assessment Methods  Explained the crisis assessment process, including applicable information disclosures and limits to confidentiality, assessed understanding of the process, and obtained consent to proceed with the assessment.  Assessment methods included conducting a formal interview with patient, review of medical records, collaboration with medical staff, and obtaining relevant collateral information from family and community providers when available.  : done     History of the Crisis   Magan carries no previous mental health diagnoses. Magan denies hx of suicide attempts, [facility] admissions, or past mental health treatment. Pt denies hx of substance use. Pt describes \"the worst\" period of time for him was 19 years ago when his wife was hospitalized at [place] for two months; He reports feeling very anxious and emotional, though did not have any suicidal thoughts. Pt works one day a week at the [employer]. Pt had surgery in August of 2024 for pulmonary embolism and has had difficulty tolerating his usual physical activity since. Pt's wife started noticing symptoms of memory difficulty and fatigue over the past few weeks, and thinks it may be related to a medication they gave him after surgery.    Brief Psychosocial History  Family:  , Children yes  Support System:  Wife, Friend  Employment Status:  retired (works one day per week at [employer] " [employer])  Source of Income:  unable to assess  Financial Environmental Concerns:   (financial strain that has limited ability to take vacations)  Current Hobbies:   (outdoor walks, going out to eat, spending time with wife, being around tractors with farm friends, travelling)  Barriers in Personal Life:  medical conditions/precautions, financial concerns    Significant Clinical History  Current Anxiety Symptoms:     Current Depression/Trauma:  sadness, thoughts of death/suicide  Current Somatic Symptoms:     Current Psychosis/Thought Disturbance:     Current Eating Symptoms:   (none noted)  Chemical Use History:  Alcohol: None  Benzodiazepines: None  Opiates: None  Cocaine: None  Marijuana: None  Other Use: None   Past diagnosis:  No known past diagnosis  Family history:  No known history of mental health or chemical health concerns  Past treatment:  No known formal treatment attempts  Details of most recent treatment:  No mental health tx hx.  Other relevant history:       Have there been any medication changes in the past two weeks:  no       Is the patient compliant with medications:  yes        Collateral Information  Is there collateral information: Yes     Collateral information name, relationship, phone number:  Aniyah Montanez, Wife    What happened today: Aniyah reports that pt had a hard time remembering how to put the cartridge into the printer this morning. She also noticed that he didn't recognize her handwriting. This made her concerned, and she started to look up the side effects of the medication he was on. She states that they came in primarily to get a CT scan to make sure he was okay.     What is different about patient's functioning: Aniyah reports increased fatigue, difficulty with memory, and taking a longer time to do usual tasks. Aniyah states that pt seemed more withdrawn a few weeks ago, and she asked about depression and suicide. Pt told her that he sometimes had thoughts about ending it.  "Since then, Aniyah frequently asks pt about his mood, which he is typically forthcoming about.       Has patient made comments about wanting to kill themselves/others: yes    If d/c is recommended, can they take part in safety/aftercare planning:  yes    Additional collateral information:  Aniyah states that he has a joyful spirit, wakes up early in the morning, and typically is joking around with her.     Risk Assessment  [place] Suicide Severity Rating Scale Full Clinical Version:  Suicidal Ideation  Q1 Wish to be Dead (Lifetime): Yes  Q2 Non-Specific Active Suicidal Thoughts (Lifetime): Yes  3. Active Suicidal Ideation with any Methods (Not Plan) Without Intent to Act (Lifetime): Yes  4. Active Suicidal Ideation with Some Intent to Act, Without Specific Plan (Lifetime): No  5. Active Suicidal Ideation with Specific Plan and Intent (Lifetime): No  Q6 Suicide Behavior (Lifetime): no  Intensity of Ideation (Lifetime)  Most Severe Ideation Rating (Lifetime): 3  Description of Most Severe Ideation (Lifetime): \"Sometimes I just want to be done.\"  Frequency (Lifetime): Less than once a week  Duration (Lifetime): Fleeting, few seconds or minutes  Controllability (Lifetime): Can control thoughts with little difficulty  Deterrents (Lifetime): Deterrents definitely stopped you from attempting suicide  Reasons for Ideation (Lifetime): Mostly to end or stop the pain (You couldn't go on living with the pain or how you were feeling)  Suicidal Behavior (Lifetime)  Actual Attempt (Lifetime): No  Has subject engaged in non-suicidal self-injurious behavior? (Lifetime): No  Interrupted Attempts (Lifetime): No  Aborted or Self-Interrupted Attempt (Lifetime): No  Preparatory Acts or Behavior (Lifetime): No    [place] Suicide Severity Rating Scale Recent:   Suicidal Ideation (Recent)  Q1 Wished to be Dead (Past Month): no  Q2 Suicidal Thoughts (Past Month): yes  Q3 Suicidal Thought Method: yes  Q4 Suicidal Intent without Specific Plan: " "no  Q5 Suicide Intent with Specific Plan: no (states access to guns but reports no plan to act)  Level of Risk per Screen: moderate risk  Intensity of Ideation (Recent)  Most Severe Ideation Rating (Past 1 Month): 3  Description of Most Severe Ideation (Past 1 Month): \"Sometimes I just want to be done,\" and states that he has thought about using his gun  Frequency (Past 1 Month): Less than once a week  Duration (Past 1 Month): Less than 1 hour/some of the time  Controllability (Past 1 Month): Easily able to control thoughts  Deterrents (Past 1 Month): Deterrents definitely stopped you from attempting suicide  Reasons for Ideation (Past 1 Month): Mostly to end or stop the pain (You couldn't go on living with the pain or how you were feeling)  Suicidal Behavior (Recent)  Actual Attempt (Past 3 Months): No  Has subject engaged in non-suicidal self-injurious behavior? (Past 3 Months): No  Interrupted Attempts (Past 3 Months): No  Aborted or Self-Interrupted Attempt (Past 3 Months): No  Preparatory Acts or Behavior (Past 3 Months): No    Environmental or Psychosocial Events:  (loss of several peers throughout the past few years, surgery in August of 2024, financial strain)  Protective Factors: Protective Factors: strong bond to family unit, community support, or employment, intact marriage or domestic partnership, responsibilities and duties to others, including pets and children, lives in a responsibly safe and stable environment, help seeking, constructive use of leisure time, enjoyable activities, resilience, optimistic outlook - identification of future goals, sense of belonging    Does the patient have thoughts of harming others? Feels Like Hurting Others: no  Previous Attempt to Hurt Others: no  Is the patient engaging in sexually inappropriate behavior?: no  Does Patient have a known history of aggressive behavior: No  Has aggression occurred as a result of MH concerns/diagnosis: N/A  Does patient have history of " "aggression in hospital: N/A    Is the patient engaging in sexually inappropriate behavior?  no        Mental Status Exam   Affect: Appropriate  Appearance: Appropriate  Attention Span/Concentration: Attentive  Eye Contact: Engaged    Fund of Knowledge: Appropriate   Language /Speech Content: Fluent  Language /Speech Volume: Normal  Language /Speech Rate/Productions: Normal  Recent Memory: Intact  Remote Memory: Intact  Mood: Normal  Orientation to Person: Yes   Orientation to Place: Yes  Orientation to Time of Day: Yes  Orientation to Date: Yes     Situation (Do they understand why they are here?): Yes  Psychomotor Behavior: Normal  Thought Content: Clear  Thought Form: Intact     Mini-Cog Assessment  Not completed due to virtual assessment.     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 Pt carries no previous mental health diagnoses nor received any previous treatment. Pt reports having a major surgery in August of 2024 and has had a difficult time adjusting to his new lifestyle. Pt reports feeling more tired than usual and difficulty remembering things over the past few weeks. Pt reports \"a few thoughts\" of suicide roughly two weeks ago. When asked about methods, pt states, \"I would grab my gun, go into the " "garage, and just end it.\" Pt then states that he would never do this if his wife was alive, and reports his marriage as a very strong deterrent to SI and symptoms of depression. Pt describes the nature of his SI as feeling that he would have nothing left to live for; He references friends of his that have passed away in nursing homes. Pt has strong protective factor of willingness to express his emotions to his wife and a group of friends that he meets with weekly. Pt is future oriented and speaks positively of his grandchildren and an upcoming weekend trip. Pt denies current SI, HI, AH/VH, DAVIN, and SIB. Writer does offer outpatient therapy referral to help cope with adjustment after surgery, though pt declines. After therapeutic assessment, intervention and aftercare planning by ED care team and LM and in consultation with attending provider, the patient's circumstances and mental state were appropriate for outpatient management. It is the recommendation of this clinician that pt discharge home with his wife. Pt engages in safety planning and has been provided crisis numbers and mental health resources.    Goals for crisis stabilization:  safety assessment and planning, offer outpatient referrals    Next steps for Care Team:  none    Treatment Objectives Addressed:  rapport building, orienting the patient to therapy, safety planning    Therapeutic Interventions:  Engaged in safety planning, Taught the link between thoughts, feelings, and behaviors.    Has a specific means been identified for suicidal/homicide actions: Yes    If yes, describe:  Using firearm in garage    Explain action steps toward mitigation:  Assessed for risk factors, intentions, and deterrents from SI    Document completion of mitigation actions:  Pt denies intent to act o this plan. Pt states that he has had this thought a few weeks ago, and was quickly deterred by the thought of his wife. Pt engaged in safety planning, with his wife. He " identifies several warning signs that him and his wife will monitor. Wife verbalizes understanding to remove firearms from pt's access if pt startes displaying warning signs or depressive symptoms.    The follow up action still needed prior to discharge:  None. Safety planning completed.    Patient coping skills attempted to reduce the crisis:  Pt communicates with his wife about his mental wellbeing. Pt willingly participated in mental health assessment.    Disposition  Recommended referrals:  (Pt denies outpatient referrals. Placed follow up number if he changes his mind about therapy referral.)        Reviewed case and recommendations with attending provider. Attending Name: Dr. Romel Devries       Attending concurs with disposition: yes       Patient and/or validated legal guardian concurs with disposition:   yes       Final disposition:  discharge                            Legal status:  Voluntary                                                                                                                                         Assessment Details   Total duration spent with the patient: 35 min     CPT code(s) utilized: 80369 - Psychotherapy for Crisis - 60 (30-74*) min    OTTO Smalls, Psychotherapist  [facility] - Triage & Transition Services  Callback: [phone]

## 2025-03-06 NOTE — ED PROVIDER NOTES
"  Emergency Department Note      History of Present Illness     Chief Complaint   Fatigue and Altered Mental Status      HPI   Magan Montanez is a 79 year old male presents to the emergency department for changes in memory and fatigue.  History also provided from his wife.  She reports that patient has been sleeping more and more.  She notes that today he came in from shoveling and seemed very fatigued and immediately fell asleep for 30 to 40 minutes as soon as he sat down in his chair.  She states that he was certainly sleeping did not lose consciousness.  No jerking motions or confusion when waking up.  She notes that it seems his memories have been worsening around mid January to February, noting for example that he took an ink cartridge out of a printer but could not recall how to put it back in.  She thinks that this may have started when his warfarin was started.  Also notes that he has been repeating things.  Patient himself states that he has not been feeling sick.  He notes quotation a lot of this feels mental\".  Today they spoke to the nurse to try to go to the see his PCP, however RN did recommend he present to the emergency room for head imaging.  Patient denies any fever, urinary symptoms.  No chest pain at all.  He is not sure of breath here in the ER.  No falls or head strike.  No missed dosages of his warfarin.  No increased swelling in his legs.  Patient has sleep apnea however does not wear CPAP at night.    Of note, patient screened positive for suicide risk.  Patient himself adamantly denies any plans to kill himself.  Wife states that he has mentioned that he has had very decreased quality of life.  They do have guns in the house.    Independent Historian   Wife as detailed above.    Review of External Notes   I reviewed the August admission from 8/25-8/27 for acute saddle PE    Past Medical History     Medical History and Problem List   Past Medical History:   Diagnosis Date    HLD " (hyperlipidemia)     HTN (hypertension)        Medications   lisinopril (ZESTRIL) 5 MG tablet  loperamide (IMODIUM) 2 MG capsule  Pediatric Multivit-Minerals (GUMMY VITAMINS & MINERALS) chewable tablet  rivaroxaban ANTICOAGULANT (XARELTO) 2.5 MG TABS tablet  simvastatin (ZOCOR) 20 MG tablet        Surgical History   Past Surgical History:   Procedure Laterality Date    IR PULMONARY ANGIOGRAM BILATERAL  8/26/2024       Physical Exam     Patient Vitals for the past 24 hrs:   BP Temp Temp src Pulse Resp SpO2 Height Weight   03/05/25 2105 (!) 146/85 -- -- 62 12 98 % -- --   03/05/25 2050 (!) 147/84 -- -- 63 14 100 % -- --   03/05/25 2035 (!) 166/83 -- -- 65 11 100 % -- --   03/05/25 2020 (!) 157/96 -- -- 62 21 100 % -- --   03/05/25 2005 (!) 143/79 -- -- 60 11 100 % -- --   03/05/25 1950 (!) 147/82 -- -- 61 16 100 % -- --   03/05/25 1820 138/75 -- -- 59 -- 100 % -- --   03/05/25 1812 -- -- -- 60 12 100 % -- --   03/05/25 1757 (!) 144/78 -- -- -- -- 97 % -- --   03/05/25 1659 (!) 146/90 98.4  F (36.9  C) Temporal 73 18 97 % 1.829 m (6') 101.8 kg (224 lb 6.9 oz)     Physical Exam  General: Overall stable and nontoxic appearing  HEENT: Conjunctivae clear, no scleral icterus, mucous membranes moist  Neuro: Alert, conversant; no facial droop, no slurred speech; strength and sensation grossly intact and equal bilaterally in upper and lower extremities; gait is intact without ataxia   CV: Regular rate, regular rhythm, radial and DP pulses equal  Respiratory: No signs of respiratory distress, lungs clear to auscultation bilaterally   Abdomen: Soft, without rigidity or rebound throughout  MSK: No lower extremity swelling or tenderness     Diagnostics     Lab Results   Labs Ordered and Resulted from Time of ED Arrival to Time of ED Departure   COMPREHENSIVE METABOLIC PANEL - Abnormal       Result Value    Sodium 139      Potassium 4.4      Carbon Dioxide (CO2) 25      Anion Gap 9      Urea Nitrogen 16.3      Creatinine 1.29 (*)      GFR Estimate 57 (*)     Calcium 9.2      Chloride 105      Glucose 95      Alkaline Phosphatase 74      AST 20      ALT 20      Protein Total 6.8      Albumin 4.1      Bilirubin Total 0.3     NT PROBNP INPATIENT - Normal    N terminal Pro BNP Inpatient <36     CBC WITH PLATELETS AND DIFFERENTIAL    WBC Count 6.9      RBC Count 4.96      Hemoglobin 14.0      Hematocrit 41.9      MCV 85      MCH 28.2      MCHC 33.4      RDW 12.3      Platelet Count 244      % Neutrophils 58      % Lymphocytes 28      % Monocytes 8      % Eosinophils 5      % Basophils 2      % Immature Granulocytes 0      NRBCs per 100 WBC 0      Absolute Neutrophils 4.0      Absolute Lymphocytes 1.9      Absolute Monocytes 0.6      Absolute Eosinophils 0.3      Absolute Basophils 0.1      Absolute Immature Granulocytes 0.0      Absolute NRBCs 0.0         Imaging   Head CT w/o contrast   Final Result   IMPRESSION:   1.  No CT evidence for acute intracranial process.   2.  Brain atrophy and presumed chronic microvascular ischemic changes as above.             EKG   ECG results from 03/05/25   EKG 12 lead     Value    Systolic Blood Pressure     Diastolic Blood Pressure     Ventricular Rate 61    Atrial Rate 61    AR Interval 158    QRS Duration 84        QTc 416    P Axis 38    R AXIS 31    T Axis 42    Interpretation ECG      Sinus rhythm Q-wave noted in leads III through V1.  V2 t wave inversion is unchanged from EKG from August 2024.     Independent Interpretation   CT Head: No intracranial hemorrhage or midline shift.    ED Course      Medications Administered   Medications - No data to display    Procedures   Procedures     Discussion of Management   [facility]    ED Course   ED Course as of 03/06/25 1352   Wed Mar 05, 2025   2130 Discussed staying in ED however patient and wife would like to leave and not stay for this. Has been seen by [facility].        Additional Documentation  None    Medical Decision Making / Diagnosis     CMS Diagnoses:  None    MIPS       None    [facility]   Magan Montanez is a 79 year old male with a history of MARYLOU not on CPAP, previous PE on anticoagulation without any missed dosages, hypertension, who presents to the emergency department with a chief complaint of worsening memory and increased fatigue and decreased exercise tolerance.  He was told to present to the ED for head imaging.  As he is on warfarin did consider the possibility of spontaneous bleed or subdural leading to his symptoms, reassuringly CT head without any evidence of this.  Creatinine is mildly elevated from baseline.  No electrolyte abnormalities.  BNP is within normal limits and he is not convincingly fluid overloaded on examination.  EKG was obtained from triage, patient does not have any chest pain I do not think that this presentation is consistent with ACS.  He is adherent to his therapeutic anticoagulation lower suspicion for breakthrough PE. I suspect that untreated MARYLOU is contributing quite a bit to his daytime sleepiness which could be contributing to cognitive component as well.  Did plan for UA however patient and wife did not want to stay for this I did express that occasionally UTIs can present with memory changes however they declined further evaluation.  Patient was seen by DEC and felt that it was safe for him to go home, wife will plan to take guns away and patient is adamant that he would never kill himself.  I have counseled close follow-up with primary care provider to further discuss memory changes.  Strict return precautions discussed and they verbalized understanding.      Disposition   The patient was discharged.     Diagnosis     ICD-10-CM    1. Fatigue, unspecified type  R53.83       2. Excessive sleepiness  G47.10       3. Memory changes  R41.3            Discharge Medications   Discharge Medication List as of 3/5/2025  9:32 PM            MD Romel Conn Connie, MD  03/06/25 [id number]

## 2025-05-03 ENCOUNTER — HEALTH MAINTENANCE LETTER (OUTPATIENT)
Age: 80
End: 2025-05-03

## (undated) RX ORDER — HEPARIN SODIUM 200 [USP'U]/100ML
INJECTION, SOLUTION INTRAVENOUS
Status: DISPENSED
Start: 2024-08-26

## (undated) RX ORDER — HEPARIN SODIUM 1000 [USP'U]/ML
INJECTION, SOLUTION INTRAVENOUS; SUBCUTANEOUS
Status: DISPENSED
Start: 2024-08-26

## (undated) RX ORDER — LIDOCAINE HYDROCHLORIDE 10 MG/ML
INJECTION, SOLUTION INFILTRATION; PERINEURAL
Status: DISPENSED
Start: 2024-08-26